# Patient Record
Sex: FEMALE | Race: WHITE | Employment: FULL TIME | URBAN - METROPOLITAN AREA
[De-identification: names, ages, dates, MRNs, and addresses within clinical notes are randomized per-mention and may not be internally consistent; named-entity substitution may affect disease eponyms.]

---

## 2017-07-24 NOTE — PROGRESS NOTES
TUBERCULOSIS SCREENING QUESTIONNAIRE  · Live vaccines in the past month? no  · Any steroid medication in the past month? no  · History of BCG vaccine? no  · If female, are you currently pregnant?  no  · Are you currently experiencing any of the following sy

## 2017-07-24 NOTE — PATIENT INSTRUCTIONS
You will need to return to clinic in 48-72 hours to have results of TB test read. Please return to clinic on Wednesday 7/26/17 between 5pm and 7:30pm or on Thursday 7/27/17 between 8am and 5pm  to have your TB test read.     If you do not return this cassie

## 2017-08-01 NOTE — PROGRESS NOTES
[facility] Internal Medicine Office Note  Chief Complaint:   Patient presents with:  Establish Care: former PCP was Dr. Diana Adame, insurance changed. Physical: physical for work, brought in paperwork.       HPI:   This is a 23year old fe ) Disp: 90 capsule Rfl: 3         Depression Screening (PHQ-2/PHQ-9): Over the LAST 2 WEEKS   Little interest or pleasure in doing things (over the last two weeks)?: Not at all    Feeling down, depressed, or hopeless (over the last two weeks)?: Not at all ASSESSMENT AND PLAN:   Marshall Shirley is a 23year old female with  Wellness examination  (primary encounter diagnosis)  Laboratory exam ordered as part of routine general medical examination  Hypothyroidism, unspecified type      The plan is as foll

## 2017-08-04 NOTE — TELEPHONE ENCOUNTER
Patient informed of MD recommendations, patient verbalized understanding with intent to comply. Offered opportunity to ask questions, all questions were answered. No future appointments.     Time started: 11:53 pm   Time ended: 11:55 pm  Total time spent o

## 2017-08-04 NOTE — TELEPHONE ENCOUNTER
Time started: 11:11am    Time ended: 11:18am    Total time spent on chart: 7 min    Patient states that she does feel tired and sluggish. She is currently on synthroid 100mcg. She is willing to change her dose and go on a generic. Pharm is on file.   Johann

## 2017-08-04 NOTE — TELEPHONE ENCOUNTER
Attempted to reach pt regarding results below, left message to call back. Dr Wing Coker needs to know if pt is feel fatigued or other related symptoms to thyroid, if so she will increase her dose.           Notes Recorded by Rick Thomas MD on 8/3/2017 at 12:

## 2017-08-04 NOTE — TELEPHONE ENCOUNTER
Increase to levothyroxine 112 mcg daily (ordered).  Review dose administration - take on an empty stomach 1 hour before eating or other medications  Check TSH and free T4 (ordered) in 6 weeks

## 2017-08-09 NOTE — TELEPHONE ENCOUNTER
Advised pt that fasting is recommended for her next labs in 6 mos to have her lipid panel checked for more accurate results.      Time started: 3:37 pm  Time ended: 3:38 pm    Total time spent on chart: 1 min

## 2017-08-09 NOTE — TELEPHONE ENCOUNTER
From: Karol Randle  To: Zain Pastor MD  Sent: 8/9/2017 2:45 PM CDT  Subject: Test Results Question    Hi Dr. Josefina Tsai,    So I was worried when I saw that my cholesterol results were all more elevated than they should be.  I realized I did not fast for t

## 2017-12-06 NOTE — TELEPHONE ENCOUNTER
Medication passed protocol. Requesting Levothyroxine 112 mcg  LOV: 8/1/17 - wellness exam  RTC: none noted. Per t.e.8/4/17 - increase levothyroxine to 112mcg, check TSH and FT 4 in 6 weeks.    [employer] message sent to pt reminding her to complete pen

## 2018-01-04 NOTE — TELEPHONE ENCOUNTER
Pt has requested records to be sent to Dr Prince Lazaro;pap,colpo,IUD insertion/removal any years of treatment    sent [id number], [address], [place] [address]    Mailed on January 4,2018

## 2018-02-14 NOTE — TELEPHONE ENCOUNTER
Pt notified that TSH and free T4 was ordered 8/2017, she can have labs drawn through [facility] labs and then ENDO will order their own labs. Understanding verbalized.

## 2018-02-14 NOTE — TELEPHONE ENCOUNTER
Patient called to request an order for lab work. Patient states she will be seeing a new endocrinologist and needs an order.

## 2018-02-19 NOTE — PROGRESS NOTES
CHIEF COMPLAINT:   Patient presents with:  Cough: x 2 days      HPI:   Serg Wayne is a 23year old female who presents for upper respiratory symptoms for  2 days. Patient reports congestion, cough is keeping pt up at night.  Symptoms have been unchanged NOSE: Nostrils patent, clear nasal discharge, nasal mucosa pink and non-inflammed   THROAT: Oral mucosa pink, moist. Posterior pharynx is not erythematous. no exudates. Tonsils 2/4.     NECK: Supple, non-tender  LUNGS: clear to auscultation bilaterally, no · You touch your eyes, nose, or mouth when your hand has a cold virus on it. This can happen if you touch an object that has the cold virus on it. What are the symptoms of a cold virus? Almost all colds involve a stuffy nose.  Other common symptoms includ · Disinfect things you touch often, such as phones and keyboards.    · Stay home when you have a cold. What are the possible complications of a cold virus? Colds usually go away by themselves.  But it’s not unusual to get another type of infection while y

## 2018-02-22 NOTE — PROGRESS NOTES
[facility] Internal Medicine Office Note  Chief Complaint:   Patient presents with: Follow - Up: [facility] follow up cough  x 5 days      HPI:   This is a 23year old female coming in for cough  HPI   Also sinus pressure b/l and sore throat  Temp 99. 9 Estrad 91-Day 0.15-0.03 MG Oral Tab Take 1 tablet by mouth daily. Disp: 91 tablet Rfl: 0         REVIEW OF SYSTEMS:   Review of Systems   Constitutional: Negative for fever. HENT: Positive for ear pain, sinus pressure and sore throat.     Eyes: Negative f up for worsening symptoms or fever above 100.4    Other orders  -     guaiFENesin-codeine (CHERATUSSIN AC) 100-10 MG/5ML Oral Solution; Take 5 mL by mouth nightly as needed for cough.       Meds & Refills for this Visit:  Signed Prescriptions Disp Refills

## 2018-02-22 NOTE — PATIENT INSTRUCTIONS
You can try Afrin nasal spray for decongestant, but do not take longer than 72 hours as it can cause worsening congestion. (do not take sudafed and afrin at the same time). Prescription cough syrup as needed at bedtime.

## 2019-02-14 NOTE — TELEPHONE ENCOUNTER
Please call patient: lab results received from health screening and let her know that results look good.  Labs are normal      From health screening:    Tchol 171  HDL 43  Trig 121      Glu 78    Cr 0.65   Normal LFTs    WBC 5.8  HGB 12.5  Plt 346

## 2023-06-08 LAB
ANTIBODY SCREEN OB: NEGATIVE
HEPATITIS B SURFACE ANTIGEN OB: NEGATIVE
HIV RESULT OB: NEGATIVE
RAPID PLASMA REAGIN OB: NONREACTIVE

## 2023-06-26 LAB — RH FACTOR OB: POSITIVE

## 2023-10-30 LAB
HIV RESULT OB: NEGATIVE
TREPONEMAL ANTIBODIES: NONREACTIVE

## 2023-11-27 LAB — STREP GP B CULT OB: NEGATIVE

## 2023-12-18 ENCOUNTER — HOSPITAL ENCOUNTER (INPATIENT)
Facility: HOSPITAL | Age: 28
LOS: 3 days | Discharge: HOME | End: 2023-12-21
Attending: OBSTETRICS & GYNECOLOGY | Admitting: OBSTETRICS & GYNECOLOGY
Payer: COMMERCIAL

## 2023-12-18 ENCOUNTER — ANESTHESIA (OUTPATIENT)
Dept: OBGYN UNIT | Facility: HOSPITAL | Age: 28
End: 2023-12-18
Payer: COMMERCIAL

## 2023-12-18 ENCOUNTER — ANESTHESIA EVENT (OUTPATIENT)
Dept: OBGYN UNIT | Facility: HOSPITAL | Age: 28
End: 2023-12-18
Payer: COMMERCIAL

## 2023-12-18 PROBLEM — Z34.90 PREGNANCY (HCC): Status: ACTIVE | Noted: 2023-12-18

## 2023-12-18 PROBLEM — Z34.90 PREGNANCY: Status: ACTIVE | Noted: 2023-12-18

## 2023-12-18 LAB
ANTIBODY SCREEN: NEGATIVE
BASOPHILS # BLD AUTO: 0.03 X10(3) UL (ref 0–0.2)
BASOPHILS NFR BLD AUTO: 0.4 %
EOSINOPHIL # BLD AUTO: 0.05 X10(3) UL (ref 0–0.7)
EOSINOPHIL NFR BLD AUTO: 0.7 %
ERYTHROCYTE [DISTWIDTH] IN BLOOD BY AUTOMATED COUNT: 12.3 %
HCT VFR BLD AUTO: 37.9 %
HGB BLD-MCNC: 12.9 G/DL
IMM GRANULOCYTES # BLD AUTO: 0.04 X10(3) UL (ref 0–1)
IMM GRANULOCYTES NFR BLD: 0.6 %
LYMPHOCYTES # BLD AUTO: 2.02 X10(3) UL (ref 1–4)
LYMPHOCYTES NFR BLD AUTO: 27.9 %
MCH RBC QN AUTO: 32.5 PG (ref 26–34)
MCHC RBC AUTO-ENTMCNC: 34 G/DL (ref 31–37)
MCV RBC AUTO: 95.5 FL
MONOCYTES # BLD AUTO: 0.39 X10(3) UL (ref 0.1–1)
MONOCYTES NFR BLD AUTO: 5.4 %
NEUTROPHILS # BLD AUTO: 4.7 X10 (3) UL (ref 1.5–7.7)
NEUTROPHILS # BLD AUTO: 4.7 X10(3) UL (ref 1.5–7.7)
NEUTROPHILS NFR BLD AUTO: 65 %
PLATELET # BLD AUTO: 262 10(3)UL (ref 150–450)
RBC # BLD AUTO: 3.97 X10(6)UL
RH BLOOD TYPE: POSITIVE
T PALLIDUM AB SER QL IA: NONREACTIVE
WBC # BLD AUTO: 7.2 X10(3) UL (ref 4–11)

## 2023-12-18 PROCEDURE — 86780 TREPONEMA PALLIDUM: CPT | Performed by: OBSTETRICS & GYNECOLOGY

## 2023-12-18 PROCEDURE — 86850 RBC ANTIBODY SCREEN: CPT | Performed by: OBSTETRICS & GYNECOLOGY

## 2023-12-18 PROCEDURE — 85025 COMPLETE CBC W/AUTO DIFF WBC: CPT | Performed by: OBSTETRICS & GYNECOLOGY

## 2023-12-18 PROCEDURE — 86901 BLOOD TYPING SEROLOGIC RH(D): CPT | Performed by: OBSTETRICS & GYNECOLOGY

## 2023-12-18 PROCEDURE — 86900 BLOOD TYPING SEROLOGIC ABO: CPT | Performed by: OBSTETRICS & GYNECOLOGY

## 2023-12-18 RX ORDER — CHOLECALCIFEROL (VITAMIN D3) 25 MCG
1 TABLET,CHEWABLE ORAL DAILY
Status: DISCONTINUED | OUTPATIENT
Start: 2023-12-18 | End: 2023-12-21

## 2023-12-18 RX ORDER — CHOLECALCIFEROL (VITAMIN D3) 25 MCG
1 TABLET,CHEWABLE ORAL DAILY
COMMUNITY
End: 2023-12-21

## 2023-12-18 RX ORDER — METHYLERGONOVINE MALEATE 0.2 MG/ML
0.2 INJECTION INTRAVENOUS ONCE
Status: COMPLETED | OUTPATIENT
Start: 2023-12-18 | End: 2023-12-18

## 2023-12-18 RX ORDER — KETOROLAC TROMETHAMINE 30 MG/ML
30 INJECTION, SOLUTION INTRAMUSCULAR; INTRAVENOUS ONCE
Status: COMPLETED | OUTPATIENT
Start: 2023-12-18 | End: 2023-12-18

## 2023-12-18 RX ORDER — DOXYCYCLINE HYCLATE 50 MG/1
325 CAPSULE, GELATIN COATED ORAL
COMMUNITY
End: 2023-12-21

## 2023-12-18 RX ORDER — KETOROLAC TROMETHAMINE 30 MG/ML
INJECTION, SOLUTION INTRAMUSCULAR; INTRAVENOUS
Status: COMPLETED
Start: 2023-12-18 | End: 2023-12-18

## 2023-12-18 RX ORDER — ACETAMINOPHEN 500 MG
1000 TABLET ORAL EVERY 6 HOURS
Status: DISCONTINUED | OUTPATIENT
Start: 2023-12-18 | End: 2023-12-21

## 2023-12-18 RX ORDER — KETAMINE HYDROCHLORIDE 50 MG/ML
INJECTION, SOLUTION INTRAMUSCULAR; INTRAVENOUS
Status: DISPENSED
Start: 2023-12-18 | End: 2023-12-18

## 2023-12-18 RX ORDER — DEXTROSE, SODIUM CHLORIDE, SODIUM LACTATE, POTASSIUM CHLORIDE, AND CALCIUM CHLORIDE 5; .6; .31; .03; .02 G/100ML; G/100ML; G/100ML; G/100ML; G/100ML
INJECTION, SOLUTION INTRAVENOUS CONTINUOUS PRN
Status: DISCONTINUED | OUTPATIENT
Start: 2023-12-18 | End: 2023-12-21

## 2023-12-18 RX ORDER — BUPIVACAINE HYDROCHLORIDE 7.5 MG/ML
INJECTION, SOLUTION INTRASPINAL AS NEEDED
Status: DISCONTINUED | OUTPATIENT
Start: 2023-12-18 | End: 2023-12-18 | Stop reason: SURG

## 2023-12-18 RX ORDER — HYDROMORPHONE HYDROCHLORIDE 1 MG/ML
0.2 INJECTION, SOLUTION INTRAMUSCULAR; INTRAVENOUS; SUBCUTANEOUS EVERY 5 MIN PRN
Status: DISCONTINUED | OUTPATIENT
Start: 2023-12-18 | End: 2023-12-18 | Stop reason: HOSPADM

## 2023-12-18 RX ORDER — NALBUPHINE HYDROCHLORIDE 10 MG/ML
2.5 INJECTION, SOLUTION INTRAMUSCULAR; INTRAVENOUS; SUBCUTANEOUS
Status: DISCONTINUED | OUTPATIENT
Start: 2023-12-18 | End: 2023-12-18 | Stop reason: HOSPADM

## 2023-12-18 RX ORDER — MORPHINE SULFATE 2 MG/ML
INJECTION, SOLUTION INTRAMUSCULAR; INTRAVENOUS AS NEEDED
Status: DISCONTINUED | OUTPATIENT
Start: 2023-12-18 | End: 2023-12-18 | Stop reason: SURG

## 2023-12-18 RX ORDER — MISOPROSTOL 200 UG/1
1000 TABLET ORAL ONCE
Status: COMPLETED | OUTPATIENT
Start: 2023-12-18 | End: 2023-12-18

## 2023-12-18 RX ORDER — KETOROLAC TROMETHAMINE 30 MG/ML
30 INJECTION, SOLUTION INTRAMUSCULAR; INTRAVENOUS EVERY 6 HOURS
Qty: 4 ML | Refills: 0 | Status: DISPENSED | OUTPATIENT
Start: 2023-12-18 | End: 2023-12-19

## 2023-12-18 RX ORDER — SODIUM CHLORIDE, SODIUM LACTATE, POTASSIUM CHLORIDE, CALCIUM CHLORIDE 600; 310; 30; 20 MG/100ML; MG/100ML; MG/100ML; MG/100ML
INJECTION, SOLUTION INTRAVENOUS CONTINUOUS
Status: DISCONTINUED | OUTPATIENT
Start: 2023-12-18 | End: 2023-12-18

## 2023-12-18 RX ORDER — GABAPENTIN 300 MG/1
300 CAPSULE ORAL EVERY 8 HOURS PRN
Status: DISCONTINUED | OUTPATIENT
Start: 2023-12-18 | End: 2023-12-19

## 2023-12-18 RX ORDER — ACETAMINOPHEN 500 MG
1000 TABLET ORAL ONCE
Status: COMPLETED | OUTPATIENT
Start: 2023-12-18 | End: 2023-12-18

## 2023-12-18 RX ORDER — SODIUM CHLORIDE, SODIUM LACTATE, POTASSIUM CHLORIDE, CALCIUM CHLORIDE 600; 310; 30; 20 MG/100ML; MG/100ML; MG/100ML; MG/100ML
125 INJECTION, SOLUTION INTRAVENOUS CONTINUOUS
Status: DISCONTINUED | OUTPATIENT
Start: 2023-12-18 | End: 2023-12-18 | Stop reason: HOSPADM

## 2023-12-18 RX ORDER — DOCUSATE SODIUM 100 MG/1
100 CAPSULE, LIQUID FILLED ORAL
Status: DISCONTINUED | OUTPATIENT
Start: 2023-12-18 | End: 2023-12-21

## 2023-12-18 RX ORDER — CITRIC ACID/SODIUM CITRATE 334-500MG
30 SOLUTION, ORAL ORAL ONCE
Status: DISCONTINUED | OUTPATIENT
Start: 2023-12-18 | End: 2023-12-18 | Stop reason: HOSPADM

## 2023-12-18 RX ORDER — CEFAZOLIN SODIUM/WATER 2 G/20 ML
2 SYRINGE (ML) INTRAVENOUS ONCE
Status: COMPLETED | OUTPATIENT
Start: 2023-12-18 | End: 2023-12-18

## 2023-12-18 RX ORDER — ONDANSETRON 2 MG/ML
INJECTION INTRAMUSCULAR; INTRAVENOUS AS NEEDED
Status: DISCONTINUED | OUTPATIENT
Start: 2023-12-18 | End: 2023-12-18 | Stop reason: SURG

## 2023-12-18 RX ORDER — METHYLERGONOVINE MALEATE 0.2 MG/ML
INJECTION INTRAVENOUS
Status: COMPLETED
Start: 2023-12-18 | End: 2023-12-18

## 2023-12-18 RX ORDER — MIDAZOLAM HYDROCHLORIDE 1 MG/ML
INJECTION INTRAMUSCULAR; INTRAVENOUS AS NEEDED
Status: DISCONTINUED | OUTPATIENT
Start: 2023-12-18 | End: 2023-12-18 | Stop reason: SURG

## 2023-12-18 RX ORDER — ONDANSETRON 2 MG/ML
4 INJECTION INTRAMUSCULAR; INTRAVENOUS EVERY 6 HOURS PRN
Status: DISCONTINUED | OUTPATIENT
Start: 2023-12-18 | End: 2023-12-18 | Stop reason: HOSPADM

## 2023-12-18 RX ORDER — BISACODYL 10 MG
10 SUPPOSITORY, RECTAL RECTAL ONCE AS NEEDED
Status: DISCONTINUED | OUTPATIENT
Start: 2023-12-18 | End: 2023-12-21

## 2023-12-18 RX ORDER — MISOPROSTOL 200 UG/1
TABLET ORAL
Status: COMPLETED
Start: 2023-12-18 | End: 2023-12-18

## 2023-12-18 RX ORDER — SIMETHICONE 80 MG
80 TABLET,CHEWABLE ORAL 3 TIMES DAILY PRN
Status: DISCONTINUED | OUTPATIENT
Start: 2023-12-18 | End: 2023-12-21

## 2023-12-18 RX ORDER — ONDANSETRON 2 MG/ML
4 INJECTION INTRAMUSCULAR; INTRAVENOUS ONCE AS NEEDED
Status: DISCONTINUED | OUTPATIENT
Start: 2023-12-18 | End: 2023-12-18 | Stop reason: HOSPADM

## 2023-12-18 RX ORDER — IBUPROFEN 600 MG/1
600 TABLET ORAL EVERY 6 HOURS
Status: DISCONTINUED | OUTPATIENT
Start: 2023-12-19 | End: 2023-12-21

## 2023-12-18 RX ORDER — LEVOTHYROXINE SODIUM 112 UG/1
112 TABLET ORAL
Status: DISCONTINUED | OUTPATIENT
Start: 2023-12-18 | End: 2023-12-21

## 2023-12-18 RX ADMIN — CEFAZOLIN SODIUM/WATER 2 G: 2 G/20 ML SYRINGE (ML) INTRAVENOUS at 09:18:00

## 2023-12-18 RX ADMIN — BUPIVACAINE HYDROCHLORIDE 1.5 ML: 7.5 INJECTION, SOLUTION INTRASPINAL at 09:15:00

## 2023-12-18 RX ADMIN — SODIUM CHLORIDE, SODIUM LACTATE, POTASSIUM CHLORIDE, CALCIUM CHLORIDE: 600; 310; 30; 20 INJECTION, SOLUTION INTRAVENOUS at 10:05:00

## 2023-12-18 RX ADMIN — MORPHINE SULFATE 0.2 MG: 2 INJECTION, SOLUTION INTRAMUSCULAR; INTRAVENOUS at 09:15:00

## 2023-12-18 RX ADMIN — ONDANSETRON 4 MG: 2 INJECTION INTRAMUSCULAR; INTRAVENOUS at 09:10:00

## 2023-12-18 RX ADMIN — SODIUM CHLORIDE, SODIUM LACTATE, POTASSIUM CHLORIDE, CALCIUM CHLORIDE: 600; 310; 30; 20 INJECTION, SOLUTION INTRAVENOUS at 09:10:00

## 2023-12-18 RX ADMIN — MIDAZOLAM HYDROCHLORIDE 2 MG: 1 INJECTION INTRAMUSCULAR; INTRAVENOUS at 09:35:00

## 2023-12-18 NOTE — PROGRESS NOTES
Pt is a 25year old female admitted to [facility]. Chief Complaint   Patient presents with    Scheduled       Pt is  39w2d intra-uterine pregnancy. History obtained, consents signed. Oriented to room, staff, and plan of care.

## 2023-12-18 NOTE — PROGRESS NOTES
Pt transferred to [facility] in room 2216. Vital signs stable on transfer. All belongings sent with patient. Baby ID bands matched and verified with parents.  Report given to [facility].

## 2023-12-18 NOTE — PLAN OF CARE
Problem: BIRTH - VAGINAL/ SECTION  Goal: Fetal and maternal status remain reassuring during the birth process  Description: INTERVENTIONS:  - Monitor vital signs  - Monitor fetal heart rate  - Monitor uterine activity  - Monitor labor progression (vaginal delivery)  - DVT prophylaxis (C/S delivery)  - Surgical antibiotic prophylaxis (C/S delivery)  Outcome: Progressing     Problem: PAIN - ADULT  Goal: Verbalizes/displays adequate comfort level or patient's stated pain goal  Description: INTERVENTIONS:  - Encourage pt to monitor pain and request assistance  - Assess pain using appropriate pain scale  - Administer analgesics based on type and severity of pain and evaluate response  - Implement non-pharmacological measures as appropriate and evaluate response  - Consider cultural and social influences on pain and pain management  - Manage/alleviate anxiety  - Utilize distraction and/or relaxation techniques  - Monitor for opioid side effects  - Notify MD/LIP if interventions unsuccessful or patient reports new pain  - Anticipate increased pain with activity and pre-medicate as appropriate  Outcome: Progressing     Problem: ANXIETY  Goal: Will report anxiety at manageable levels  Description: INTERVENTIONS:  - Administer medication as ordered  - Teach and rehearse alternative coping skills  - Provide emotional support with 1:1 interaction with staff  Outcome: Progressing     Problem: Patient/Family Goals  Goal: Patient/Family Long Term Goal  Description: Patient's Long Term Goal: Uncomplicated  section    Interventions:    VS per protocol  EFM per protocol  I&O  Aviles catheter  Abd prep with clippers as needed  SCD to bilateral lower extremities  NPO  Insert/maintain IV access  Antibiotics per protocol  Informed consent      Interventions:  -   - See additional Care Plan goals for specific interventions  Outcome: Progressing  Goal: Patient/Family Short Term Goal  Description: Patient's Short Term Goal: Adequate pain control with delivery of infant  Interventions:  Pain assessment scores as ordered  Patient scores pain a \"3\" or less  Multidisciplinary care   Nonpharmacologic comfort measures        Interventions:   -   - See additional Care Plan goals for specific interventions  Outcome: Progressing

## 2023-12-18 NOTE — PLAN OF CARE
Problem: GASTROINTESTINAL - ADULT  Goal: Minimal or absence of nausea and vomiting  Description: INTERVENTIONS:  - Maintain adequate hydration with IV or PO as ordered and tolerated  - Nasogastric tube to low intermittent suction as ordered  - Evaluate effectiveness of ordered antiemetic medications  - Provide nonpharmacologic comfort measures as appropriate  - Advance diet as tolerated, if ordered  - Obtain nutritional consult as needed  - Evaluate fluid balance  Outcome: Progressing  Goal: Maintains or returns to baseline bowel function  Description: INTERVENTIONS:  - Assess bowel function  - Maintain adequate hydration with IV or PO as ordered and tolerated  - Evaluate effectiveness of GI medications  - Encourage mobilization and activity  - Obtain nutritional consult as needed  - Establish a toileting routine/schedule  - Consider collaborating with pharmacy to review patient's medication profile  Outcome: Progressing  Goal: Maintains adequate nutritional intake (undernourished)  Description: INTERVENTIONS:  - Monitor percentage of each meal consumed  - Identify factors contributing to decreased intake, treat as appropriate  - Assist with meals as needed  - Monitor I&O, WT and lab values  - Obtain nutritional consult as needed  - Optimize oral hygiene and moisture  - Encourage food from home; allow for food preferences  - Enhance eating environment  Outcome: Progressing  Goal: Achieves appropriate nutritional intake (bariatric)  Description: INTERVENTIONS:  - Monitor for over-consumption  - Identify factors contributing to increased intake, treat as appropriate  - Monitor I&O, WT and lab values  - Obtain nutritional consult as needed  - Evaluate psychosocial factors contributing to over-consumption  Outcome: Progressing     Problem: POSTPARTUM  Goal: Long Term Goal:Experiences normal postpartum course  Description: INTERVENTIONS:  - Assess and monitor vital signs and lab values. - Assess fundus and lochia.   - Provide ice/sitz baths for perineum discomfort. - Monitor healing of incision/episiotomy/laceration, and assess for signs and symptoms of infection and hematoma. - Assess bladder function and monitor for bladder distention.  - Provide/instruct/assist with pericare as needed. - Provide VTE prophylaxis as needed. - Monitor bowel function.  - Encourage ambulation and provide assistance as needed. - Assess and monitor emotional status and provide social service/psych resources as needed. - Utilize standard precautions and use personal protective equipment as indicated. Ensure aseptic care of all intravenous lines and invasive tubes/drains.  - Obtain immunization and exposure to communicable diseases history. Outcome: Progressing  Goal: Optimize infant feeding at the breast  Description: INTERVENTIONS:  - Initiate breast feeding within first hour after birth. - Monitor effectiveness of current breast feeding efforts. - Assess support systems available to mother/family.  - Identify cultural beliefs/practices regarding lactation, letdown techniques, maternal food preferences. - Assess mother's knowledge and previous experience with breast feeding.  - Provide information as needed about early infant feeding cues (e.g., rooting, lip smacking, sucking fingers/hand) versus late cue of crying.  - Discuss/demonstrate breast feeding aids (e.g., infant sling, nursing footstool/pillows, and breast pumps). - Encourage mother/other family members to express feelings/concerns, and actively listen. - Educate father/SO about benefits of breast feeding and how to manage common lactation challenges. - Recommend avoidance of specific medications or substances incompatible with breast feeding.  - Assess and monitor for signs of nipple pain/trauma. - Instruct and provide assistance with proper latch. - Review techniques for milk expression (breast pumping) and storage of breast milk.  Provide pumping equipment/supplies, instructions and assistance, as needed. - Encourage rooming-in and breast feeding on demand.  - Encourage skin-to-skin contact. - Provide LC support as needed. - Assess for and manage engorgement. - Provide breast feeding education handouts and information on community breast feeding support. Outcome: Progressing  Goal: Establishment of adequate milk supply with medication/procedure interruptions  Description: INTERVENTIONS:  - Review techniques for milk expression (breast pumping). - Provide pumping equipment/supplies, instructions, and assistance until it is safe to breastfeed infant. Outcome: Progressing  Goal: Experiences normal breast weaning course  Description: INTERVENTIONS:  - Assess for and manage engorgement. - Instruct on breast care. - Provide comfort measures. Outcome: Completed  Goal: Appropriate maternal -  bonding  Description: INTERVENTIONS:  - Assess caregiver- interactions. - Assess caregiver's emotional status and coping mechanisms. - Encourage caregiver to participate in  daily care. - Assess support systems available to mother/family.  - Provide /case management support as needed.   Outcome: Progressing

## 2023-12-18 NOTE — BRIEF OP NOTE
Pre-Operative Diagnosis: 44 WEEK IUP BREECH     Post-Operative Diagnosis: SAME, DELIVERED      Procedure Performed:    SECTION    Surgeon(s) and Role:     * Alicia Day MD - Primary     * Shavon Munoz MD - Assisting Surgeon       Surgical Findings: meek breech female, 7# 14oz (8, 8,9). Heart shaped uterus, normal tubes and ovaries.      Specimen: none     Estimated Blood Loss: 900cc    Ady Gamez MD  2023  9:55 AM

## 2023-12-18 NOTE — ANESTHESIA PROCEDURE NOTES
Spinal Block    Performed by: Rufus Landry MD  Authorized by: Rufus Landry MD      General Information and Staff    Start Time:   Anesthesiologist:  Rufus Landry MD  Performed by: Anesthesiologist  Site identification: surface landmarks    Reason for Block: at surgeon's request and surgical anesthesia    Preanesthetic Checklist: patient identified, IV checked, risks and benefits discussed, monitors and equipment checked, pre-op evaluation, timeout performed, anesthesia consent and sterile technique used      Procedure Details    Patient Position:  Sitting  Prep: ChloraPrep    Monitoring:  Cardiac monitor, heart rate and continuous pulse ox  Approach:  Midline  Location:  L4-5  Injection Technique:  Single-shot    Needle    Needle Type:  Sprotte  Needle Gauge:  24 G  Needle Length:  3.5 in    Assessment    Sensory Level:  T6  Events: clear CSF, CSF aspirated, well tolerated and blood negative      Additional Comments     First attempt successful. No resistance, pain or parasthesias on injection. Aspiration of clear CSF both at start of injection and at end.

## 2023-12-19 LAB
BASOPHILS # BLD AUTO: 0.01 X10(3) UL (ref 0–0.2)
BASOPHILS NFR BLD AUTO: 0.1 %
EOSINOPHIL # BLD AUTO: 0.06 X10(3) UL (ref 0–0.7)
EOSINOPHIL NFR BLD AUTO: 0.7 %
ERYTHROCYTE [DISTWIDTH] IN BLOOD BY AUTOMATED COUNT: 12.3 %
HCT VFR BLD AUTO: 25 %
HGB BLD-MCNC: 8.4 G/DL
IMM GRANULOCYTES # BLD AUTO: 0.06 X10(3) UL (ref 0–1)
IMM GRANULOCYTES NFR BLD: 0.7 %
LYMPHOCYTES # BLD AUTO: 1.66 X10(3) UL (ref 1–4)
LYMPHOCYTES NFR BLD AUTO: 19.6 %
MCH RBC QN AUTO: 33.1 PG (ref 26–34)
MCHC RBC AUTO-ENTMCNC: 33.6 G/DL (ref 31–37)
MCV RBC AUTO: 98.4 FL
MONOCYTES # BLD AUTO: 0.41 X10(3) UL (ref 0.1–1)
MONOCYTES NFR BLD AUTO: 4.9 %
NEUTROPHILS # BLD AUTO: 6.25 X10 (3) UL (ref 1.5–7.7)
NEUTROPHILS # BLD AUTO: 6.25 X10(3) UL (ref 1.5–7.7)
NEUTROPHILS NFR BLD AUTO: 74 %
PLATELET # BLD AUTO: 192 10(3)UL (ref 150–450)
RBC # BLD AUTO: 2.54 X10(6)UL
WBC # BLD AUTO: 8.5 X10(3) UL (ref 4–11)

## 2023-12-19 PROCEDURE — 85025 COMPLETE CBC W/AUTO DIFF WBC: CPT | Performed by: OBSTETRICS & GYNECOLOGY

## 2023-12-19 RX ORDER — GABAPENTIN 300 MG/1
300 CAPSULE ORAL EVERY 8 HOURS
Status: DISCONTINUED | OUTPATIENT
Start: 2023-12-19 | End: 2023-12-21

## 2023-12-21 VITALS
TEMPERATURE: 98 F | WEIGHT: 174 LBS | BODY MASS INDEX: 30.83 KG/M2 | HEIGHT: 63 IN | OXYGEN SATURATION: 100 % | RESPIRATION RATE: 14 BRPM | HEART RATE: 81 BPM | DIASTOLIC BLOOD PRESSURE: 79 MMHG | SYSTOLIC BLOOD PRESSURE: 116 MMHG

## 2023-12-21 PROBLEM — Z34.90 PREGNANCY (HCC): Status: RESOLVED | Noted: 2023-12-18 | Resolved: 2023-12-21

## 2023-12-21 PROBLEM — Z34.90 PREGNANCY: Status: RESOLVED | Noted: 2023-12-18 | Resolved: 2023-12-21

## 2023-12-21 RX ORDER — PSEUDOEPHEDRINE HCL 30 MG
100 TABLET ORAL 2 TIMES DAILY
Qty: 60 CAPSULE | Refills: 1 | Status: SHIPPED | OUTPATIENT
Start: 2023-12-21

## 2023-12-21 RX ORDER — IBUPROFEN 600 MG/1
600 TABLET ORAL EVERY 6 HOURS
Qty: 60 TABLET | Refills: 1 | Status: SHIPPED | OUTPATIENT
Start: 2023-12-21

## 2023-12-21 RX ORDER — GABAPENTIN 300 MG/1
300 CAPSULE ORAL EVERY 8 HOURS
Qty: 30 CAPSULE | Refills: 1 | Status: SHIPPED | OUTPATIENT
Start: 2023-12-21

## 2023-12-21 NOTE — DISCHARGE SUMMARY
[facility]  Discharge Summary    Ibrahima Yang Patient Status:  Inpatient    3/27/1995 MRN [id number]   [facility] 2SW-J Attending Lore Suarez MD   [facility] Day # 3 PCP Mirian Jensen MD     Primary OB Clinician: Melissa Bermudez    [facility]: Estimated Date of Delivery: 23    Gestational Age: 44w2d    Antepartum complications: none    Date of Delivery: 23    Time of Delivery: AM    Delivered By: Sherlyn Grayson MD    Delivery Type: primary  section, low transverse incision    Baby: Liveborn female, Apgars 9/9, weight 7 #, 14 oz. Anesthesia: spinal    Intrapartum Complications: None    Laceration: n/a    Episiotomy: Pfannensteil    Placenta: manual removal    Feeding Method: both breast and bottle fed     Rh Immune Globulin Given: no    Rubella Vaccine Given: no    Discharge Medications: Gabapentin, IBU, Colace    Discharge Date: 23    Discharge Time: AM    Plan:     Address and phone number verified and same. Follow-up appointment with Melissa Bermudez in 2 weeks.     Sherlyn Grayson MD  2023  7:59 AM

## 2023-12-21 NOTE — PROGRESS NOTES
Went over discharge paperwork with patient. Patient understands she needs to make a follow up appt to be seen in 2 weeks for postpartum visit. Went over signs and symptoms of preeclampsia and postpartum expectations. Patient encouraged to call doctor with any concerns. Bands matched with infant. Kisses deactivated.

## 2023-12-23 ENCOUNTER — TELEPHONE (OUTPATIENT)
Dept: OBGYN UNIT | Facility: HOSPITAL | Age: 28
End: 2023-12-23

## 2024-01-31 ENCOUNTER — NURSE ONLY (OUTPATIENT)
Dept: LACTATION | Facility: HOSPITAL | Age: 29
End: 2024-01-31
Attending: OBSTETRICS & GYNECOLOGY
Payer: COMMERCIAL

## 2024-01-31 ENCOUNTER — LACTATION ENCOUNTER (OUTPATIENT)
Dept: LACTATION | Facility: HOSPITAL | Age: 29
End: 2024-01-31

## 2024-01-31 PROCEDURE — 99212 OFFICE O/P EST SF 10 MIN: CPT

## 2024-01-31 RX ORDER — MULTIVIT,IRON,MINERALS/LUTEIN
TABLET ORAL
COMMUNITY

## 2024-02-01 NOTE — LACTATION NOTE
This note was copied from a baby's chart.  LACTATION NOTE - INFANT    Evaluation Type  Evaluation Type: Outpatient Initial    Problems & Assessment  Problems Diagnosed or Identified: Infant feeding problem;Latch difficulty  Problems: comment/detail: Kori was born at 39.2 wks, c/s delivery for breech presentation. She was Jewell+ and required phototherapy in the hospital. Kroi weighed 7lbs 14.3 oz at birth and weighed 9 lbs 15.1 oz today at 6 wks old and is unable to sustain a latch with BF attempts. Mother has been exclusively pumping and bottle feeding since discharge home. Infant is currently being seen at [facility] for hip dysplasia and is wearing a Lucretia Harness. Mother came to the [facility] for BF/milk supply support. She wanted help trying to BF and had questions about pumping. Mother has a history of anxiety and she scored 7/30 on the EPDS which was the same as during her hospital stay. Mother has a counsellor for emotional support, she interacted well with infant and asked appropriate question.  introduced a Nipple Shield with BF attempt today, however Kori was not able to sustain a latch and was bottle fed Expressed breast milk. Kori shows signs she may have some oral restrictions contributing to latch difficulty. Infant makes a smacking noise intermittently as she releases the seal on the bottle nipple, EBM leaks out of the corners of her mouth when she is sucking and she has tightness/ creases in the corners of her mouth as she is sucking. Discussed information with mother who is a school based speech therapist and a handout on tongue tie was given and discussed with mother. Enc to discuss with pediatrician.  Infant Assessment: Anterior fontanel soft and flat;Abdomen soft, non-distended;Hunger cues present;Oral mucous membranes moist;Skin color: pink or appropriate for ethnicity (High palate, slightly recessed chin)  Muscle tone: Appropriate for GA    Feeding  Assessment  Summary Current Feeding: Pumping and feeding by bottle  Last 24 hour feeding summary: Bottles 8 (150 ml EBM)  Breastfeeding Assessment: Assisted with breastfeeding w/mother's permission;Fussy infant, unable to sustain suckling to breast;No sustained latch to breast;Nipple shield initiated with non-nutritive suckling;PO supplement followed breastfeeding  Breastfeeding lasted # of minutes: 10  Breastfeeding Positions: right breast;left breast;cross cradle  Latch: Repeated attempts, hold nipple in mouth, stimulate to suck  Audible Sucks/Swallows: None  Type of Nipple: Flat  Comfort (Breast/Nipple): Soft/non-tender  Hold (Positioning): Full assist, teach one side, mother does other, staff holds  LATCH Score: 5  Other (comment): Assisted with BF attempt. Infant was fussy with BF attempt and LC introduced the NS. Infant latched on the 1st breast using the NS, took a few sucks, but was unable to sustain a latch and no measurable amount of milk was transfered with the Breastfeeding attempt.  Mother had questions about her pumping and feeding plan Discussed mother's pumping schedule and trying to extend the pumping time during the night so she is able to get more rest. Mother has been waking infant by 3 hrs for feedings including during the night. Mother stated she was advised by ped infant could go longer during the night between feedings. LC stated that pt should try to go up to 4 hrs during the night if infant is sleeping to allow for more rest. At this time mother feels comfortable with pumping and bottle feeding, but she may try practicing with the NS. Discussed precautions if mother is BF with a NS for close follow up for weight checks and continuing pumping until BF would be established. Enc to call LC if she has further questions about pumping/Bf. Follow up with LC prn. Mother had questions about infant having a possible tongue tie. Discussed signs of possible oral restrictions and enc to follow up with a  speech/myofunctional therapist and discuss information with her pediatrician.    Output  # Voids in 24 hours: 8  # Stools in 24 hours: 5    Pre/Post Weights  Pre-Weight Right Breast (g): 5076  Post-Weight Right Breast (g): 5076  ml of milk, RT Brst: 0  Supplement Type: EBM  Supplement total, ml: 100    Equipment used  Equipment used: Bottle with slow flow nipple;Nipple Shield  Nipple shield size: 20 mm

## 2024-02-01 NOTE — PATIENT INSTRUCTIONS
Betzy if you choose to attempt to breastfeed Kori with a Nipple Shield, remember that it's important to have her weight check weekly until your pediatrician feels she is Breastfeeding well and she is gaining weight appropriately.  Watch her wet diapers and stools and if you notice her wet diapers are decreasing have a weight check sooner. Offer supplements of Expressed breast milk after Breastfeeding until she is doing well with Breastfeeding and use your breast pump after breastfeeding until her feedings are well established. Follow up with the [facility] for assistance as needed.    Otherwise continue your feeding plan with bottle feeding Kori on demand and waking her by 3 hours during the day and extending her feedings at night to 4 hours if she is able to sleep. As you extend your pumping frequency, check for plugged milk ducts, review the care for plugged milk ducts and call your OB Doctor with any concerns.  If you are concerned that Kori may have any oral restrictions, you may discuss this with your pediatrician and/or have Kori evaluated by a speech/myofunctional therapist  Discuss the feeding plan with your pediatrician if you have any questions.    Guidelines for Using a Nipple Shield    Refer to the ’s instructions. These are additional suggestions only.  This thin silicone nipple shield (size 20mm ) has been recommended to assist your baby to latch on to the breast or for protection of your nipples while your baby learns to breastfeed correctly.  Use of the shield is considered temporary, allowing you to begin breastfeeding your baby. Some infants have  successfully using the shield for longer periods, however, checking your infant’s weight regularly is recommended to assure adequate growth while using the nipple shield.    Cautions regarding nipple shield use:  The use of a nipple shield may decrease your milk supply and could decrease the amount of milk your  baby receives.  Careful follow-up, including weight checks, with your lactation consultant and baby’s health care provider is important.   Do not use a different nipple shield.     Before feeding your baby:  Apply warm, moist heat to your breasts for a few minutes to increase milk flow.  Rinse the shield in warm water to make it          softer and easier to adhere to the breast.  Apply nipple shield correctly:               Hold the shield by the rim, turn it inside-out senior living. Place the shield, centered over the                 nipple and flip the shield right side out, drawing your nipple and areola into the                shield as much as possible.  Massage breasts and if possible, hand express  some breastmilk into the nipple shield.     Latching your baby on to the breast:  Stroke your baby’s lower lip with the nipple of the shield. Wait for your baby to open wide like a “yawn,” with the tongue down and out over the lower lip. Bring baby’s mouth directly over the shield. It may take a few attempts before your baby settles into a rhythmical suckling pattern.  After several minutes of regular swallowing at the breast, you may want to try to reattach your baby to your breast without the shield.  When your baby’s swallowing slows on the first side, repeat this process on the other breast.   If needed, trickle drops of your expressed milk or formula onto the nipple to encourage your baby to latch on. If your baby becomes upset or has not latched on within 20 minutes, stop and feed your baby using another method.    Signs of correct, effective suckling include:  Your baby swallows with nearly every suck (this is called nutritive suckling: swallowing every 1-3 sucks)  Your baby sustains nutritive suck and swallow pattern for at least 15-30 minutes, offer both breasts at each feeding.  Your nipples are not painful during the feeding.  Your baby is content after most feedings.  Your baby has adequate diapers (at  least 6-8 wet and 3-4 loose, yellow stools every 24 hours by the 4th day of life) AND gains at least 4-8 ounces per week (one-half to one ounce per day). Use the breastfeeding journal to record your baby’s feedings and diapers.    Is a supplement needed?  If your baby does not latch on, or swallows less than 15-30 minutes at a feeding - swallowing after most sucks (at least every 1-3 sucks), feed your baby additional expressed breastmilk or formula by  wide base slow flow bottle with 1 to 2 +oz to satisfaction.                 After feeding your baby:  Pump your breasts for 10-15 minutes using a hospital grade rental breast pump, after most daytime feedings. This may help maintain adequate milk supply while using the shield. If your baby is not latching on yet, pump at least 8-12 times each 24 hours.  Wash the nipple shield in hot soapy water, rinse and air dry. The shield may be boiled.     Follow-up is VERY important!  Let your baby’s health care provider know immediately if it is difficult for you to feed your baby or if the number of wet or stool diapers is inadequate.   Follow-up visits with your lactation consultant and baby’s health care provider are necessary when using the shield.   Your baby’s weight should be checked 1-2 times each week while using a nipple shield.      Breastfeeding Suggestions for Plugged Duct/Breast Infection (mastitis)    Prior to handling baby, your breasts, or breast pump, remember to wash hands with soap and water.    Apply cool compress to the breast 10 minutes before pumping and gently massage the breast thorough before pumping:           Apply cold compresses for 10 minutes on your breast if needed to decrease breast swelling after nursing or pumping:     Rest and drink plenty of fluids.    Ask your doctor if you can take ibuprofen     Call your OB doctor with any signs of   mastitis (breast infection)  Plugged area(s) are not soft within 24 hours.   Breast becomes firm,  reddened, or painful.   Fever, chills, or flu-like symptoms. Check your temperature 3 times daily until a plugged duct or mastitis resolves.    If mastitis occurs:  Continue breastfeeding and/or pumping - your milk is not infected.   Continue above treatment for relieving plugged area(s)  Check with your doctor about taking ibuprofen for relief of discomfort.  Continue to take antibiotic as prescribed even though you may quickly feel better.   Contact your doctor is you are not feeling significantly better within 1-2 days of starting antibiotic, sooner if symptoms worsen.    Call lactation consultant [phone] as needed.    For additional information:   La [employer] website [email or web address]

## 2024-02-01 NOTE — LACTATION NOTE
LACTATION NOTE - MOTHER      Evaluation Type: Outpatient Initial    Problems identified  Problems identified: Knowledge deficit;Milk supply WNL;Unable to acheive sustained latch;Flat nipple(s)  Problems Identified Other: Infant was born at 39.2 wks c/s delivery for breech presentation. Infant was Jewell+ and required phototherapy in the hospital. Infant initially had difficulty latching, and needed to be supplemented with formula in the hospital. Pt has been exclusively pumping and bottle feeding since discharge home. Infant is now 6 wks old and is unable to sustain a latch with BF attempts. Infant is currently being seen at [facility] for hip dysplasia and is  wearing a Lucretia Harness. Pt came to the [facility] for BF/milk supply support. She wanted help trying to BF and had questions about pumping. Pt has a history of anxiety. She scored 7/30 on the EPDS which was the same as during her hospital stay. She has a counsellor for emotional support which she has only seen virturally, but pt reported she is trying to find someone else. LC reviewed support available through [facility] talk and [facility] support Group Reviewed the PMAD handout. Pt asked appropriate questions and interacted well with infant.    Maternal history  Maternal history: Anemia;Anxiety;Hypothyroid  Other/comment: Hx Goiter, Ovarian Cyst    Breastfeeding goal  Breastfeeding goal: To maintain breast milk feeding per patient goal    Maternal Assessment  Bilateral Breasts: Soft;Symmetrical  Bilateral Nipples: Flat;Pink;Elastic (Milk easily expressed)  Prior breastfeeding experience (comment below): Primip  Breastfeeding Assistance: Breast exam provided with permission;Hand expression provided with permission;Breastfeeding assistance provided with permission;Pumping assistance provided with permission    Pain assessment  Pain, additional: Pinching;Pain w/initial sucks only  Location/Comment: Pt c/o pinching  discomfort when infant was attempting to BF using the Nipple shield  Pain scale comment: Pt denied discomfort when using her breast pump    Guidelines for use of:  Equipment: Nipple shield  Breast pump type: Spectra  Suggested use of pump: Pump 8-12X/24hr;Pump each time a supplement is offered  Post-feed pumped volume: 100 ml in 5 mins, Pt reported Right breast is producing more milk than the Left breast  Other (comment): Assisted with BF attempt. Infant was fussy with BF attempt and LC introduced the NS. Infant latched on the 1st breast using the NS, took a few sucks, but was unable to sustain a latch and no measurable amount of milk was  transfered with the Breastfeeding attempt. Observed pt pumping, pt had questions about proper flange fit, 21mm flange fit properly and pt denied discomfort with pumping. Discussed ways to increase her milk supply on her Left breast, enc to pump an additional 5 mins on her Left side using the hands on pumping technique while pumping to help release more breast milk, enc HE before and after pumping. Discussed pt's pumping schedule and trying to extend the pumping time during the night so she is able to get more rest. Pt has been waking infant by 3 hrs for feedings including during the night. Pt stated she was advised by ped infant could go longer during the night between feedings. LC stated that pt should try to go up to 4 hrs during the night if infant is sleeping to allow for more rest. Discussed seeing how her body responds to extending the pumping interval during the night, Reviewed secondary engorgement and checking for plugged milk ducts as she extends the time between pumping. Pt had her questions answered. Feels comfortable with pumping and bottle feeding and enc to call LC if she has further questions. Follow up with [facility] prn.

## 2024-12-23 LAB
ANTIBODY SCREEN OB: NEGATIVE
HEPATITIS B SURFACE ANTIGEN OB: NEGATIVE
HIV RESULT OB: NEGATIVE
RAPID PLASMA REAGIN OB: NONREACTIVE
RH FACTOR OB: POSITIVE

## 2025-02-17 ENCOUNTER — HOSPITAL ENCOUNTER (OUTPATIENT)
Dept: ULTRASOUND IMAGING | Age: 30
Discharge: HOME | End: 2025-02-17
Attending: OBSTETRICS & GYNECOLOGY
Payer: COMMERCIAL

## 2025-02-17 DIAGNOSIS — Z34.82 PRENATAL CARE, SUBSEQUENT PREGNANCY, SECOND TRIMESTER (HCC): ICD-10-CM

## 2025-02-17 PROCEDURE — 76805 OB US >/= 14 WKS SNGL FETUS: CPT | Performed by: OBSTETRICS & GYNECOLOGY

## 2025-05-12 LAB — HIV RESULT OB: NEGATIVE

## 2025-05-13 NOTE — PROGRESS NOTES
Outpatient Maternal-Fetal Medicine Consultation    Dear Dr. Lazaro    Thank you for requesting ultrasound evaluation and maternal fetal medicine consultation on your patient Betzy Yang.  As you are aware she is a 30 year old female  with a jain pregnancy and an Estimated Date of Delivery: 25.  A maternal-fetal medicine consultation was requested secondary to size < dates.  Her prenatal records and labs were reviewed.    HISTORY  # 1 - Date: 23, Sex: None, Weight: None, GA: None, Type: None, Apgar1: None, Apgar5: None, Living: None, Birth Comments: None    # 2 - Date: 23, Sex: Female, Weight: 7 lb 14.3 oz (3.58 kg), GA: 39w2d, Type: Caesarean Section, Apgar1: 8, Apgar5: 8, Living: Living, Birth Comments: None    # 3 - Date: None, Sex: None, Weight: None, GA: None, Type: None, Apgar1: None, Apgar5: None, Living: None, Birth Comments: None      Past Medical History  The patient  has a past medical history of Hypothyroidism and Ovarian cyst.    Past Surgical History  The patient  has a past surgical history that includes wisdom teeth removed.    Family History  The patient She indicated that her mother is alive. She indicated that her father is alive. She indicated that the status of her maternal grandmother is unknown. She indicated that the status of her maternal grandfather is unknown. She indicated that her paternal grandmother is . She indicated that her paternal grandfather is alive.      Medications:   Current Outpatient Medications:     ferrous sulfate 325 (65 FE) MG Oral Tab EC, Take 1 tablet (325 mg total) by mouth daily with breakfast., Disp: , Rfl:     FOLIC ACID OR, Take by mouth., Disp: , Rfl:     Prenatal Vit-Fe Fumarate-FA (MULTI PRENATAL) 27-0.8 MG Oral Tab, Take by mouth., Disp: , Rfl:     levothyroxine 112 MCG Oral Tab, Take 1 tablet (112 mcg total) by mouth before breakfast. (Patient taking differently: Take 125 mcg by mouth. 125 mcg 5 days/week 137mcg 2  days/ week), Disp: 90 tablet, Rfl: 3    docusate sodium 100 MG Oral Cap, Take 100 mg by mouth 2 (two) times daily., Disp: 60 capsule, Rfl: 1    gabapentin 300 MG Oral Cap, Take 1 capsule (300 mg total) by mouth every 8 (eight) hours., Disp: 30 capsule, Rfl: 1    ibuprofen 600 MG Oral Tab, Take 1 tablet (600 mg total) by mouth every 6 (six) hours., Disp: 60 tablet, Rfl: 1  Allergies:   Allergies   Allergen Reactions    Wheat Gluten DIARRHEA and OTHER (SEE COMMENTS)     Abdominal cramping       PHYSICAL EXAMINATION:  /71 (BP Location: Right arm, Patient Position: Sitting, Cuff Size: adult)   Pulse 92   Ht 5' 2\" (1.575 m)   Wt 173 lb (78.5 kg)   LMP 09/27/2024   BMI 31.64 kg/m²   General: alert and oriented,no acute distress  Abdomen: gravid, soft, non-tender  Extremities: non-tender, no edema    OBSTETRIC ULTRASOUND    Ultrasound Findings:  Single IUP in breech presentation.    Placenta is anterior.   A 3 vessel cord is noted.  Cardiac activity is present at 139 bpm  EFW 2036 g ( 4 lb 8 oz); 43%.    WAYNE is  10.4 cm.  MVP is 4.2 cm  BPP is 8/8.     The fetal measurements are consistent with established EDC. No gross ultrasound evidence of structural abnormalities are seen today. The patient understands that ultrasound cannot rule out all structural and chromosomal abnormalities.     See PACS/Imaging Tab For Complete Ultrasound Report  I interpreted the results and reviewed them with the patient.    DISCUSSION  During her visit we discussed and reviewed the following issues:  SIZE < DATES  AGA growth today    IMPRESSION:  IUP at 32w6d  Hypothyroidism  Size < Dates - AGA growth    RECOMMENDATIONS:  Continue care with Dr. Lazaro  Routine antepartum care    Thank you for allowing me to participate in the care of your patient.  Please do not hesitate to contact me if additional questions or concerns arise.      Marky Tam M.D.    30 minutes spent in review of records, patient consultation, documentation and  coordination of care.  The relevant clinical matter(s) are summarized above.     Note to patient and family  The 21st Century Cures Act makes medical notes available to patients in the interest of transparency.  However, please be advised that this is a medical document.  It is intended as yubb-on-cmba communication.  It is written and medical language may contain abbreviations or verbiage that are technical and unfamiliar.  It may appear blunt or direct.  Medical documents are intended to carry relevant information, facts as evident, and the clinical opinion of the practitioner.

## 2025-05-15 ENCOUNTER — OFFICE VISIT (OUTPATIENT)
Dept: PERINATAL CARE | Facility: HOSPITAL | Age: 30
End: 2025-05-15
Attending: OBSTETRICS & GYNECOLOGY
Payer: COMMERCIAL

## 2025-05-15 VITALS
SYSTOLIC BLOOD PRESSURE: 119 MMHG | BODY MASS INDEX: 31.83 KG/M2 | HEART RATE: 92 BPM | WEIGHT: 173 LBS | HEIGHT: 62 IN | DIASTOLIC BLOOD PRESSURE: 71 MMHG

## 2025-05-15 DIAGNOSIS — O36.5930 POOR FETAL GROWTH AFFECTING MANAGEMENT OF MOTHER IN THIRD TRIMESTER, SINGLE OR UNSPECIFIED FETUS (HCC): ICD-10-CM

## 2025-05-15 DIAGNOSIS — O36.5990: ICD-10-CM

## 2025-05-15 DIAGNOSIS — O36.5990: Primary | ICD-10-CM

## 2025-05-15 PROCEDURE — 76816 OB US FOLLOW-UP PER FETUS: CPT | Performed by: OBSTETRICS & GYNECOLOGY

## 2025-05-15 PROCEDURE — 76819 FETAL BIOPHYS PROFIL W/O NST: CPT

## 2025-05-15 RX ORDER — FERROUS SULFATE 325(65) MG
325 TABLET, DELAYED RELEASE (ENTERIC COATED) ORAL
COMMUNITY

## 2025-06-06 LAB — STREP GP B CULT OB: POSITIVE

## 2025-06-13 ENCOUNTER — TELEPHONE (OUTPATIENT)
Dept: OBGYN UNIT | Facility: HOSPITAL | Age: 30
End: 2025-06-13

## 2025-06-13 NOTE — PROGRESS NOTES
Pt given arrival instructions. RN reviewed  procedure, general plan of care, and  pain medication. Questions answered. Pt verbalizes understanding.    (4) rarely moist

## 2025-06-19 ENCOUNTER — TELEPHONE (OUTPATIENT)
Dept: OBGYN UNIT | Facility: HOSPITAL | Age: 30
End: 2025-06-19

## 2025-06-24 ENCOUNTER — ANESTHESIA EVENT (OUTPATIENT)
Dept: OBGYN UNIT | Facility: HOSPITAL | Age: 30
End: 2025-06-24
Payer: COMMERCIAL

## 2025-06-27 ENCOUNTER — ANESTHESIA (OUTPATIENT)
Dept: OBGYN UNIT | Facility: HOSPITAL | Age: 30
End: 2025-06-27
Payer: COMMERCIAL

## 2025-06-27 ENCOUNTER — HOSPITAL ENCOUNTER (INPATIENT)
Facility: HOSPITAL | Age: 30
LOS: 2 days | Discharge: HOME | End: 2025-06-29
Attending: OBSTETRICS & GYNECOLOGY | Admitting: OBSTETRICS & GYNECOLOGY
Payer: COMMERCIAL

## 2025-06-27 PROBLEM — Z98.891 S/P CESAREAN SECTION: Status: ACTIVE | Noted: 2025-06-27

## 2025-06-27 PROBLEM — Z34.90 PREGNANCY (HCC): Status: ACTIVE | Noted: 2025-06-27

## 2025-06-27 LAB
ANTIBODY SCREEN: NEGATIVE
BASOPHILS # BLD AUTO: 0.02 X10(3) UL (ref 0–0.2)
BASOPHILS NFR BLD AUTO: 0.3 %
EOSINOPHIL # BLD AUTO: 0.04 X10(3) UL (ref 0–0.7)
EOSINOPHIL NFR BLD AUTO: 0.7 %
ERYTHROCYTE [DISTWIDTH] IN BLOOD BY AUTOMATED COUNT: 13 %
HCT VFR BLD AUTO: 33.7 % (ref 35–48)
HGB BLD-MCNC: 11.8 G/DL (ref 12–16)
IMM GRANULOCYTES # BLD AUTO: 0.02 X10(3) UL (ref 0–1)
IMM GRANULOCYTES NFR BLD: 0.3 %
LYMPHOCYTES # BLD AUTO: 1.91 X10(3) UL (ref 1–4)
LYMPHOCYTES NFR BLD AUTO: 32.6 %
MCH RBC QN AUTO: 32.6 PG (ref 26–34)
MCHC RBC AUTO-ENTMCNC: 35 G/DL (ref 31–37)
MCV RBC AUTO: 93.1 FL (ref 80–100)
MONOCYTES # BLD AUTO: 0.34 X10(3) UL (ref 0.1–1)
MONOCYTES NFR BLD AUTO: 5.8 %
NEUTROPHILS # BLD AUTO: 3.53 X10 (3) UL (ref 1.5–7.7)
NEUTROPHILS # BLD AUTO: 3.53 X10(3) UL (ref 1.5–7.7)
NEUTROPHILS NFR BLD AUTO: 60.3 %
PLATELET # BLD AUTO: 223 10(3)UL (ref 150–450)
RBC # BLD AUTO: 3.62 X10(6)UL (ref 3.8–5.3)
RH BLOOD TYPE: POSITIVE
T PALLIDUM AB SER QL IA: NONREACTIVE
WBC # BLD AUTO: 5.9 X10(3) UL (ref 4–11)

## 2025-06-27 PROCEDURE — 59514 CESAREAN DELIVERY ONLY: CPT | Performed by: OBSTETRICS & GYNECOLOGY

## 2025-06-27 RX ORDER — SODIUM CHLORIDE, SODIUM LACTATE, POTASSIUM CHLORIDE, CALCIUM CHLORIDE 600; 310; 30; 20 MG/100ML; MG/100ML; MG/100ML; MG/100ML
INJECTION, SOLUTION INTRAVENOUS CONTINUOUS
Status: DISCONTINUED | OUTPATIENT
Start: 2025-06-27 | End: 2025-06-29

## 2025-06-27 RX ORDER — NALOXONE HYDROCHLORIDE 0.4 MG/ML
0.08 INJECTION, SOLUTION INTRAMUSCULAR; INTRAVENOUS; SUBCUTANEOUS
Status: ACTIVE | OUTPATIENT
Start: 2025-06-27 | End: 2025-06-28

## 2025-06-27 RX ORDER — SODIUM CHLORIDE, SODIUM LACTATE, POTASSIUM CHLORIDE, CALCIUM CHLORIDE 600; 310; 30; 20 MG/100ML; MG/100ML; MG/100ML; MG/100ML
125 INJECTION, SOLUTION INTRAVENOUS CONTINUOUS
Status: DISCONTINUED | OUTPATIENT
Start: 2025-06-27 | End: 2025-06-27 | Stop reason: HOSPADM

## 2025-06-27 RX ORDER — ONDANSETRON 2 MG/ML
INJECTION INTRAMUSCULAR; INTRAVENOUS AS NEEDED
Status: DISCONTINUED | OUTPATIENT
Start: 2025-06-27 | End: 2025-06-27 | Stop reason: SURG

## 2025-06-27 RX ORDER — DIPHENHYDRAMINE HYDROCHLORIDE 50 MG/ML
12.5 INJECTION, SOLUTION INTRAMUSCULAR; INTRAVENOUS EVERY 4 HOURS PRN
Status: DISCONTINUED | OUTPATIENT
Start: 2025-06-27 | End: 2025-06-29

## 2025-06-27 RX ORDER — DOCUSATE SODIUM 100 MG/1
100 CAPSULE, LIQUID FILLED ORAL
Status: DISCONTINUED | OUTPATIENT
Start: 2025-06-27 | End: 2025-06-29

## 2025-06-27 RX ORDER — AMMONIA 15 % (W/V)
0.3 AMPUL (EA) INHALATION AS NEEDED
Status: DISCONTINUED | OUTPATIENT
Start: 2025-06-27 | End: 2025-06-29

## 2025-06-27 RX ORDER — ONDANSETRON 2 MG/ML
4 INJECTION INTRAMUSCULAR; INTRAVENOUS ONCE AS NEEDED
Status: ACTIVE | OUTPATIENT
Start: 2025-06-27 | End: 2025-06-27

## 2025-06-27 RX ORDER — KETOROLAC TROMETHAMINE 30 MG/ML
30 INJECTION, SOLUTION INTRAMUSCULAR; INTRAVENOUS EVERY 6 HOURS
Status: DISPENSED | OUTPATIENT
Start: 2025-06-28 | End: 2025-06-28

## 2025-06-27 RX ORDER — NALBUPHINE HYDROCHLORIDE 10 MG/ML
2.5 INJECTION INTRAMUSCULAR; INTRAVENOUS; SUBCUTANEOUS EVERY 4 HOURS PRN
Status: DISCONTINUED | OUTPATIENT
Start: 2025-06-27 | End: 2025-06-29

## 2025-06-27 RX ORDER — ONDANSETRON 2 MG/ML
4 INJECTION INTRAMUSCULAR; INTRAVENOUS EVERY 6 HOURS PRN
Status: DISCONTINUED | OUTPATIENT
Start: 2025-06-27 | End: 2025-06-27 | Stop reason: HOSPADM

## 2025-06-27 RX ORDER — NALBUPHINE HYDROCHLORIDE 10 MG/ML
2.5 INJECTION INTRAMUSCULAR; INTRAVENOUS; SUBCUTANEOUS
Status: DISCONTINUED | OUTPATIENT
Start: 2025-06-27 | End: 2025-06-29

## 2025-06-27 RX ORDER — ONDANSETRON 2 MG/ML
4 INJECTION INTRAMUSCULAR; INTRAVENOUS EVERY 6 HOURS PRN
Status: DISCONTINUED | OUTPATIENT
Start: 2025-06-27 | End: 2025-06-29

## 2025-06-27 RX ORDER — ACETAMINOPHEN 500 MG
1000 TABLET ORAL ONCE
Status: COMPLETED | OUTPATIENT
Start: 2025-06-27 | End: 2025-06-27

## 2025-06-27 RX ORDER — KETOROLAC TROMETHAMINE 30 MG/ML
INJECTION, SOLUTION INTRAMUSCULAR; INTRAVENOUS
Status: COMPLETED
Start: 2025-06-27 | End: 2025-06-27

## 2025-06-27 RX ORDER — DEXTROSE, SODIUM CHLORIDE, SODIUM LACTATE, POTASSIUM CHLORIDE, AND CALCIUM CHLORIDE 5; .6; .31; .03; .02 G/100ML; G/100ML; G/100ML; G/100ML; G/100ML
INJECTION, SOLUTION INTRAVENOUS CONTINUOUS PRN
Status: DISCONTINUED | OUTPATIENT
Start: 2025-06-27 | End: 2025-06-29

## 2025-06-27 RX ORDER — SIMETHICONE 80 MG
80 TABLET,CHEWABLE ORAL 3 TIMES DAILY PRN
Status: DISCONTINUED | OUTPATIENT
Start: 2025-06-27 | End: 2025-06-29

## 2025-06-27 RX ORDER — IBUPROFEN 600 MG/1
600 TABLET, FILM COATED ORAL EVERY 6 HOURS
Status: DISCONTINUED | OUTPATIENT
Start: 2025-06-28 | End: 2025-06-29

## 2025-06-27 RX ORDER — MORPHINE SULFATE 2 MG/ML
INJECTION, SOLUTION INTRAMUSCULAR; INTRAVENOUS AS NEEDED
Status: DISCONTINUED | OUTPATIENT
Start: 2025-06-27 | End: 2025-06-27 | Stop reason: SURG

## 2025-06-27 RX ORDER — ACETAMINOPHEN 500 MG
1000 TABLET ORAL EVERY 6 HOURS
Status: DISCONTINUED | OUTPATIENT
Start: 2025-06-27 | End: 2025-06-29

## 2025-06-27 RX ORDER — BUPIVACAINE HYDROCHLORIDE 7.5 MG/ML
INJECTION, SOLUTION INTRASPINAL AS NEEDED
Status: DISCONTINUED | OUTPATIENT
Start: 2025-06-27 | End: 2025-06-27 | Stop reason: SURG

## 2025-06-27 RX ORDER — LEVOTHYROXINE SODIUM 112 UG/1
112 TABLET ORAL
Status: DISCONTINUED | OUTPATIENT
Start: 2025-06-29 | End: 2025-06-29

## 2025-06-27 RX ORDER — CHOLECALCIFEROL (VITAMIN D3) 25 MCG
1 TABLET,CHEWABLE ORAL DAILY
Status: DISCONTINUED | OUTPATIENT
Start: 2025-06-27 | End: 2025-06-29

## 2025-06-27 RX ORDER — GABAPENTIN 300 MG/1
300 CAPSULE ORAL EVERY 8 HOURS PRN
Status: DISCONTINUED | OUTPATIENT
Start: 2025-06-27 | End: 2025-06-29

## 2025-06-27 RX ORDER — DIPHENHYDRAMINE HCL 25 MG
25 CAPSULE ORAL EVERY 4 HOURS PRN
Status: DISCONTINUED | OUTPATIENT
Start: 2025-06-27 | End: 2025-06-29

## 2025-06-27 RX ORDER — DEXAMETHASONE SODIUM PHOSPHATE 4 MG/ML
VIAL (ML) INJECTION AS NEEDED
Status: DISCONTINUED | OUTPATIENT
Start: 2025-06-27 | End: 2025-06-27 | Stop reason: SURG

## 2025-06-27 RX ORDER — BISACODYL 10 MG
10 SUPPOSITORY, RECTAL RECTAL ONCE AS NEEDED
Status: DISCONTINUED | OUTPATIENT
Start: 2025-06-27 | End: 2025-06-29

## 2025-06-27 RX ORDER — KETOROLAC TROMETHAMINE 30 MG/ML
30 INJECTION, SOLUTION INTRAMUSCULAR; INTRAVENOUS ONCE
Status: DISCONTINUED | OUTPATIENT
Start: 2025-06-27 | End: 2025-06-29

## 2025-06-27 RX ORDER — CITRIC ACID/SODIUM CITRATE 334-500MG
30 SOLUTION, ORAL ORAL ONCE
Status: DISCONTINUED | OUTPATIENT
Start: 2025-06-27 | End: 2025-06-27 | Stop reason: HOSPADM

## 2025-06-27 RX ADMIN — MORPHINE SULFATE 0.2 MG: 2 INJECTION, SOLUTION INTRAMUSCULAR; INTRAVENOUS at 12:03:00

## 2025-06-27 RX ADMIN — ONDANSETRON 4 MG: 2 INJECTION INTRAMUSCULAR; INTRAVENOUS at 12:03:00

## 2025-06-27 RX ADMIN — SODIUM CHLORIDE, SODIUM LACTATE, POTASSIUM CHLORIDE, CALCIUM CHLORIDE: 600; 310; 30; 20 INJECTION, SOLUTION INTRAVENOUS at 11:53:00

## 2025-06-27 RX ADMIN — DEXAMETHASONE SODIUM PHOSPHATE 4 MG: 4 MG/ML VIAL (ML) INJECTION at 12:19:00

## 2025-06-27 RX ADMIN — BUPIVACAINE HYDROCHLORIDE 1.5 ML: 7.5 INJECTION, SOLUTION INTRASPINAL at 12:03:00

## 2025-06-27 NOTE — PROGRESS NOTES
Patient transferred to mother/baby room 1119 per cart in stable condition with baby and personal belongings.  Accompanied by  and staff.  Report given to mother/baby RN.

## 2025-06-27 NOTE — PROGRESS NOTES
Patient admitted to mother baby unit with infant. Id bands checked with labor nurse. Call light within reach. Oriented to room.

## 2025-06-27 NOTE — ANESTHESIA POSTPROCEDURE EVALUATION
[facility]    Betzy Yang Patient Status:  Inpatient   Age/Gender 30 year old female MRN [id number]   Location [facility] LABOR & DELIVERY Attending Keisha Lazaro MD   Hosp Day # 0 PCP Shabana Sam MD       Anesthesia Post-op Note     SECTION    Procedure Summary       Date: 25 Room / Location:  L+D OR   L+D OR    Anesthesia Start: 1153 Anesthesia Stop: 1251    Procedure:  SECTION (Abdomen) Diagnosis:     Surgeons: Keisha Lazaro MD Anesthesiologist:     Anesthesia Type: spinal ASA Status: Not recorded            Anesthesia Type: spinal    Vitals Value Taken Time   /61 25 12:51   Temp  25 12:55   Pulse 63 25 12:54   Resp 15 25 12:54   SpO2 100 % 25 12:54   Vitals shown include unfiled device data.        Patient Location: PACU    Anesthesia Type: spinal    Airway Patency: patent    Postop Pain Control: adequate    Mental Status: preanesthetic baseline    Nausea/Vomiting: none    Cardiopulmonary/Hydration status: stable euvolemic    Complications: no apparent anesthesia related complications    Postop vital signs: stable    Dental Exam: Unchanged from Preop    Patient to be discharged from PACU when criteria met.

## 2025-06-27 NOTE — BRIEF OP NOTE
Pre-Operative Diagnosis: 39 weeks, repeat LTCS, breech     Post-Operative Diagnosis: same     Procedure Performed:    SECTION    Surgeons and Role:     * Keisha Lazaro MD - Primary     * Mirta Blas MD - Assisting Surgeon       Surgical Findings: male, 7# 5oz APGARS 8/9. Thin lower uterine segment. Normal ovaries and tubes bilaterally.     Specimen: none     Estimated Blood Loss: 800cc    Keisha Lazaro MD  2025  1:01 PM

## 2025-06-27 NOTE — OPERATIVE REPORT
[facility]   Section - Operative Note    Betzy Yang Patient Status:  Inpatient    3/27/1995 MRN [id number]   Location [facility] LABOR & DELIVERY Attending Keisha Lazaro MD   Hosp Day # 0 PCP Shabana Sam MD       Preoperative Diagnosis:  @ 39 weeks, breech, repeat LTCS         Postoperative Diagnosis: Same - Delivered    Procedure: Low Transverse  Section    Primary surgeon:  Iveth    Assistant: Wan    Indications:  breech, repeat LTCS at term.    Surgical Findings: Baby:  male  7# 5oz  APGAR 1': 8  APGAR 5': 9      Anesthesia:Spinal    EBL: 800cc    Procedure:     The patient was prepped and draped in the usual sterile fashion.  A Aviles catheter was placed.  Surgical time out was performed and scd’s were placed to decrease her risk for DVT and a dose of antibiotics was given preoperatively to decrease her risk for infection. After adequate level of anesthesia was ascertained, a Pfannenstiel incision was made and extended down to the level of the fascia over previous scar.  The fascia was incised and extended laterally with scalpel and gentle traction.  The inferior and superior aspect of the fascial incision was  elevated and dissected off the rectus muscles. The rectus muscles were  with gentle traction.  The peritoneal cavity was entered superiorly and extended inferiorly with visualization of the bladder.  A self-retaining abdominal retractor was placed.  The bladder flap was created. A low transverse incision was performed using a Eileen clamp, the excision was extended with gentle cephalocaudad traction. An amniotomy was performed. The amniotic fluid was clear. The infant was delivered feet to scapula, nuchal cord X1 reduced and then head delivered without difficulty.  The infant was vigorously crying. The cord was clamped and cut.  Cord blood was obtained.  The infant was shown to the parents and placed in the radiant warmer.  The placenta was  delivered intact with a 3 vessel cord.  Uterus, tubes and ovaries were normal in appearance.  The uterine cavity was swept clean using a wet lap.  The cervix was dilated with a ring forcep which was then passed off of the field.  The first layer of the hysterotomy was closed using 0 Chromic,  a second imbricating layer was placed with 0 Chromic.  The incision was inspected for hemostasis. The self retaining retractor was removed.  Re-inspection of the hysterotomy, peritoneum and rectus muscles noted them to be entirely hemostatic.  The rectus muscles were re-approximated using 2-0 vicryl in an interrupted fashion.  The fascia was closed with 0 vicryl in a running fashion.  The subcutaneous tissue was copiously irrigated and any bleeding cauterized, then reapproximated with O-plain.  The skin was closed with 4-0 vicryl  in a subcuticular fashion. Steri strips and benzoin applied.  The sponge, lab, needle,  and instrument counts were reported to me as being correct.  The patient tolerated the procedure and was stable to the recovery room.  The infant was stable and taken to the recovery room.    Specimen:  none    Drains: martinez    Condition:   stable    Complications: None; patient tolerated the procedure well.    Comment: Thin SHARATH, fetal feet visible through thin uterine wall.    Keisha Lazaro MD  6/27/2025  1:04 PM

## 2025-06-27 NOTE — PLAN OF CARE
Problem: BIRTH - VAGINAL/ SECTION  Goal: Fetal and maternal status remain reassuring during the birth process  Description: INTERVENTIONS:  - Monitor vital signs  - Monitor fetal heart rate  - Monitor uterine activity  - Monitor labor progression (vaginal delivery)  - DVT prophylaxis (C/S delivery)  - Surgical antibiotic prophylaxis (C/S delivery)  Outcome: Completed     Problem: PAIN - ADULT  Goal: Verbalizes/displays adequate comfort level or patient's stated pain goal  Description: INTERVENTIONS:  - Encourage pt to monitor pain and request assistance  - Assess pain using appropriate pain scale  - Administer analgesics based on type and severity of pain and evaluate response  - Implement non-pharmacological measures as appropriate and evaluate response  - Consider cultural and social influences on pain and pain management  - Manage/alleviate anxiety  - Utilize distraction and/or relaxation techniques  - Monitor for opioid side effects  - Notify MD/LIP if interventions unsuccessful or patient reports new pain  - Anticipate increased pain with activity and pre-medicate as appropriate  Outcome: Completed     Problem: ANXIETY  Goal: Will report anxiety at manageable levels  Description: INTERVENTIONS:  - Administer medication as ordered  - Teach and rehearse alternative coping skills  - Provide emotional support with 1:1 interaction with staff  Outcome: Completed     Problem: Patient/Family Goals  Goal: Patient/Family Long Term Goal  Description: Patient's Long Term Goal: safe delivery of infant    - See additional Care Plan goals for specific interventions  Outcome: Completed  Goal: Patient/Family Short Term Goal  Description: Patient's Short Term Goal: adequate pain management      - See additional Care Plan goals for specific interventions  Outcome: Completed      "9/10/2018       RE: Libby Mahajan  [address] [id number]     Dear Colleague,    Thank you for referring your patient, Libby Mahajan, to the [facility] at [facility]. Please see a copy of my visit note below.    SUBJECTIVE:   Libby is a 24 year old female who presents to clinic for a TRANSFER OF CARE visit.       at 36w5d with Estimated Date of Delivery: Oct 3, 2018 based on LMP (per patient report. No ultrasound records available).     Transfer of care from [facility].   Pt reports she received regular prenatal care. Moved here one month ago- did not explain why she did not present for care earlier. Present at appointment with her mother. , \"Jurgen,\" is in the [facility].   - States she had her records but lost them during the move.     Per pt report:   - Unclear history of elevated blood pressures:   -  Pt reports elevated BP early in pregnancy and was on anti-HTN medication.    - States meds were d'c'ed at approx 5 months of pregnancy.    - Reports that she had elevated BPs after delivery with previous pregnancy.  - Never diagnosed with CHTN,   - Agreeable to baseline pre-e labs today. Normotensive in clinic today.  - Failed 1 hour glucose, PASSED 3 hour   - States she did 3 hour glucose test this pregnancy and passed  - Gallstones- during this pregnancy  - Does not think she had GC/CT testing. Agreeable to testing today.    Reports no other problems in pregnancy.     OB history:   - 2 prior    - 2009 39+0, Girl, 6lb 13oz   - 3/8/2016 @ 39+2, Boy, 8lb 12oz    Denies cramping/contractions, vaginal bleeding, discharge or leakage of fluid. Report +fetal movement.  No HA, vision changes, ruq/epigastric pain.      ===========================================   ROS: 10 point ROS neg other than the symptoms noted above in the HPI.      PSYCHIATRIC:  Denies mood changes, difficulty concentrating, depression, " anxiety, panic attacks or post partum depression  PHQ9= 8, GAD7= 5    Past History:  Her past medical history   Past Medical History:   Diagnosis Date     Gallstone     2018 in pregnancy   .   HISTORY:  Family History   Problem Relation Age of Onset     Hypertension Mother      Asthma Mother      Social History     Social History     Marital status:      Spouse name: Jurgen     Number of children: N/A     Years of education: N/A     Social History Main Topics     Smoking status: Never Smoker     Smokeless tobacco: Never Used     Alcohol use Yes      Comment: rare before stopped in pregnancy     Drug use: No     Sexual activity: Yes     Partners: Male     Other Topics Concern     None     Social History Narrative    How much exercise per week? No much    How much calcium per day? In foods  In dairy       How much caffeine per day? 0    How much vitamin D per day? In foods    Do you/your family wear seatbelts?  Yes    Do you/your family use safety helmets? Yes    Do you/your family use sunscreen? Yes    Do you/your family keep firearms in the home? No    Do you/your family have a smoke detector(s)? Yes            Reviewed cmckim lpn 9-             No current outpatient prescriptions on file.     No current facility-administered medications for this visit.      No Known Allergies    ============================================  MEDICAL HISTORY  Past Medical History:   Diagnosis Date     Gallstone     2018 in pregnancy     Past Surgical History:   Procedure Laterality Date     NO HISTORY OF SURGERY         Obstetric History       T2      L2     SAB0   TAB0   Ectopic0   Multiple0   Live Births0       # Outcome Date GA Lbr Elie/2nd Weight Sex Delivery Anes PTL Lv   3 Current            2 Term 16 39w2d  3.969 kg (8 lb 12 oz) M          Name: Jurgen   1 Term 09 39w0d  3.09 kg (6 lb 13 oz) F          Name: Kelly MALDONADO personally reviewed the past social/family/medical  "and surgical history on the date of service.   I reviewed lab work done at Intake visit with patient.    EXAM:  /78  Pulse 105  Ht 1.651 m (5' 5\")  Wt 88.2 kg (194 lb 8 oz)  LMP 2017  BMI 32.37 kg/m2   EXAM:  GENERAL:  Pleasant pregnant female, alert, cooperative and well groomed.  SKIN:  Warm and dry, without lesions or rashes  HEAD: Symmetrical features.  MOUTH:  Buccal mucosa pink, moist without lesions.  Teeth in good repair.    NECK:  Thyroid without enlargement and nodules.  Lymph nodes not palpable.   LUNGS:  Clear to auscultation.  BREAST: deferred   HEART:  RRR without murmur.  ABDOMEN: Soft without masses , tenderness or organomegaly.  No CVA tenderness.  Uterus palpable at size equal to dates.  No scars noted.. Fetal heart tones present.  MUSCULOSKELETAL:  Full range of motion  EXTREMITIES:  No edema. No significant varicosities.   PELVIC EXAM: deferred  GC/CHLAMYDIA CULTURE OBTAINED:YES    ASSESSMENT:  24 year old , 36w5d weeks of pregnancy with MARIA DEL CARMEN of Oct 3, 2018 by LMP.   No ultrasound records available.      ICD-10-CM    1. Supervision of high-risk pregnancy, third trimester O09.93 Group B strep PCR     CBC with Platelets     AST     ALT     Protein  random urine with Creat Ratio     Creatinine     Uric acid     Chlamydia trachomatis PCR     Gonorrhea PCR     ABO/Rh Type and Screen     US OB Single Follow Up Repeat     Creatinine urine calculation only     Hgb with reflex to ELP or RBC Solubility (Sickle Cell Screen)     Hepatitis B Surface Antigen     25- OH-Vitamin D     HIV Antigen Antibody Combo     Rubella Antibody IgG Quantitative     Treponema Abs w Reflex to RPR and Titer   2. Large for dates P08.1 Group B strep PCR     CBC with Platelets     AST     ALT     Protein  random urine with Creat Ratio     Creatinine     Uric acid     Chlamydia trachomatis PCR     Gonorrhea PCR     ABO/Rh Type and Screen     US OB Single Follow Up Repeat     Creatinine urine calculation only "     Hgb with reflex to ELP or RBC Solubility (Sickle Cell Screen)     Hepatitis B Surface Antigen     25- OH-Vitamin D     HIV Antigen Antibody Combo     Rubella Antibody IgG Quantitative     Treponema Abs w Reflex to RPR and Titer       PLAN:  - Reviewed use of triage nurse line and contacting the on-call provider after hours for an urgent need such as fever, vagina bleeding, bladder or vaginal infection, rupture of membranes,  or term labor.    - Reviewed CNM and MD care.   Desires CNM care.    - All pt's questions discussed and answered.  Pt verbalized understanding of and agreement to plan of care.     - Reviewed prenatal course per by patient report of history as no written records are available.  - RN attempting to obtain records.    - GBS obtained.  - Abo/Rh T&S ordered.  - GC/CT collected.  - Pre-e labs ordered d/t pt report of elevated BP on anti-HTN medication at beginning of pregnancy and elevated BP at end of previous pregnancy?  - Plan growth ultrasound with next visit d/t fundal height greater than dates.    - Continue SARAH visit next week if records available. Review EOB ed.     - Continue scheduled prenatal care, RTC in 1 week for growth ultrasound and 30 min visit and prn if questions or concerns    AHMET Guan CNM    Addendum:    Clinic RN has tried multiple times to obtain records and has been unsuccessful.  - Future orders placed for remaining routine prenatal labs (spoke with lab, unable to add on to previously drawn labs).  - Consider drawing these labs after discussion at next visit and/or if records still not available.  - Review EOB education at next visit.  - Review any more information re: hx of elevated BP.  May need to consult with OB MD team re: any recommendations for  surveillance or delivery timing.    AHMET Guan CNM         Again, thank you for allowing me to participate in the care of your patient.      Sincerely,    Alexander Galaviz  CNM

## 2025-06-27 NOTE — ANESTHESIA PROCEDURE NOTES
Spinal Block    Date/Time: 6/27/2025 11:58 AM    Performed by: Obed Duke MD  Authorized by: Obed Duke MD      General Information and Staff    Start Time:  6/27/2025 11:58 AM  End Time:  6/27/2025 12:03 PM  Anesthesiologist:  Obed Duke MD  Performed by:  Anesthesiologist  Patient Location:  OR  Site identification: surface landmarks    Reason for Block: at surgeon's request and surgical anesthesia    Preanesthetic Checklist: patient identified, IV checked, risks and benefits discussed, monitors and equipment checked, pre-op evaluation, timeout performed, anesthesia consent and sterile technique used      Procedure Details    Patient Position:  Sitting  Prep: ChloraPrep    Monitoring:  Cardiac monitor, heart rate and continuous pulse ox  Approach:  Midline  Location:  L3-4  Injection Technique:  Single-shot    Needle    Needle Type:  Sprotte  Needle Gauge:  24 G  Needle Length:  3.5 in    Assessment    Sensory Level:   Events: clear CSF, CSF aspirated, well tolerated and blood negative      Additional Comments

## 2025-06-27 NOTE — ANESTHESIA PREPROCEDURE EVALUATION
PRE-OP EVALUATION    Patient Name: Betzy Yang    Admit Diagnosis: No admission diagnoses are documented for this encounter.    Pre-op Diagnosis: * No pre-op diagnosis entered *     SECTION    Anesthesia Procedure:  SECTION    Surgeons and Role:     * Keisha Lazaro MD - Primary    Pre-op vitals reviewed.        There is no height or weight on file to calculate BMI.    Current medications reviewed.  Hospital Medications:  Current Medications[1]    Outpatient Medications:   Prescriptions Prior to Admission[2]    Allergies: Wheat gluten      Anesthesia Evaluation    Patient summary reviewed.    Anesthetic Complications  (-) history of anesthetic complications         GI/Hepatic/Renal    Negative GI/hepatic/renal ROS.                             Cardiovascular    Negative cardiovascular ROS.    Exercise tolerance: good     MET: >4                                           Endo/Other           (+) hypothyroidism                       Pulmonary    Negative pulmonary ROS.                       Neuro/Psych    Negative neuro/psych ROS.                          Hx CS x1        Past Surgical History[3]  Social Hx on file[4]  History   Drug Use No     Available pre-op labs reviewed.               Airway      Mallampati: II  Mouth opening: 3 FB  TM distance: 4 - 6 cm  Neck ROM: full Cardiovascular      Rhythm: regular  Rate: normal     Dental    Dentition appears grossly intact         Pulmonary      Breath sounds clear to auscultation bilaterally.               Other findings              ASA: 2   Plan: spinal  NPO status verified and patient meets guidelines.  Patient has not taken beta blockers in last 24 hours.  Post-procedure pain management plan discussed with surgeon and patient.      Plan/risks discussed with: patient and spouse (Risks of spinal discussed including bleeding, infection, damage to nerves (all very rare), headache, need to convert to GA and those risks including N/V, sore  throat, dental injruy, cardiopulmonary compromise. Pt in agreement with plan.)  Use of blood product(s) discussed with: patient    Consented to blood products.          Present on Admission:  **None**             [1]   No current facility-administered medications on file as of .   [2]   No medications prior to admission.   [3]   Past Surgical History:  Procedure Laterality Date          Cascilla teeth removed     [4]   Social History  Socioeconomic History    Marital status:    Tobacco Use    Smoking status: Never    Smokeless tobacco: Never   Vaping Use    Vaping status: Never Used   Substance and Sexual Activity    Alcohol use: Not Currently     Comment: 6-7 drinks weekly    Drug use: No    Sexual activity: Yes     Partners: Male     Birth control/protection: Pill   Other Topics Concern    Caffeine Concern Yes     Comment: 1-2 Q day    Exercise Yes     Comment: runs 3x per week

## 2025-06-27 NOTE — PROGRESS NOTES
Pt is a 30 year old female admitted to TR5/TRG5-A.     Chief Complaint   Patient presents with    Scheduled       Pt is  39w0d intra-uterine pregnancy.  History obtained, oriented to room, staff, and plan of care.     EFM tested and applied. Abdomen soft and non-tender.

## 2025-06-27 NOTE — H&P
[facility]  History & Physical    Betzy Doreen Yang Patient Status:  Inpatient    3/27/1995 MRN [id number]   Location [facility] LABOR & DELIVERY Attending Keisha Lazaro MD   Hosp Day # 0 PCP Shabana Sam MD     Subjective:   30 year old  @ 39 weeks scheduled for repeat LTCS for breech presentation. Pregnancy without complications.    Objective:  Vital signs in last 24 hours:         General:   alert, appears stated age and cooperative   Skin:   normal   HEENT:  PERRLA   Lungs:   clear to auscultation bilaterally   Heart:   regular rate and rhythm   Breasts:   normal without suspicious masses   Abdomen:  normal findings: Gravid   FHT:  125 BPM   Uterine Size: size equals dates   Presentations: breech   Cervix:                                  Lab Results   Component Value Date    WBC 5.9 2025    HGB 11.8 2025    HCT 33.7 2025    .0 2025       Assessment/Plan:   @ 39 weeks for repeat LTCS for breech   Procedure reviewed at length with patient. Risk of injury to bowel, bladder.  All questions answered.

## 2025-06-28 LAB
BASOPHILS # BLD AUTO: 0.02 X10(3) UL (ref 0–0.2)
BASOPHILS NFR BLD AUTO: 0.2 %
EOSINOPHIL # BLD AUTO: 0.07 X10(3) UL (ref 0–0.7)
EOSINOPHIL NFR BLD AUTO: 0.8 %
ERYTHROCYTE [DISTWIDTH] IN BLOOD BY AUTOMATED COUNT: 13.2 %
HCT VFR BLD AUTO: 31.1 % (ref 35–48)
HGB BLD-MCNC: 10.7 G/DL (ref 12–16)
IMM GRANULOCYTES # BLD AUTO: 0.04 X10(3) UL (ref 0–1)
IMM GRANULOCYTES NFR BLD: 0.5 %
LYMPHOCYTES # BLD AUTO: 2.27 X10(3) UL (ref 1–4)
LYMPHOCYTES NFR BLD AUTO: 25.9 %
MCH RBC QN AUTO: 32.9 PG (ref 26–34)
MCHC RBC AUTO-ENTMCNC: 34.4 G/DL (ref 31–37)
MCV RBC AUTO: 95.7 FL (ref 80–100)
MONOCYTES # BLD AUTO: 0.4 X10(3) UL (ref 0.1–1)
MONOCYTES NFR BLD AUTO: 4.6 %
NEUTROPHILS # BLD AUTO: 5.97 X10 (3) UL (ref 1.5–7.7)
NEUTROPHILS # BLD AUTO: 5.97 X10(3) UL (ref 1.5–7.7)
NEUTROPHILS NFR BLD AUTO: 68 %
PLATELET # BLD AUTO: 220 10(3)UL (ref 150–450)
RBC # BLD AUTO: 3.25 X10(6)UL (ref 3.8–5.3)
WBC # BLD AUTO: 8.8 X10(3) UL (ref 4–11)

## 2025-06-28 NOTE — PROGRESS NOTES
[facility] 1SW-J ori Yang Patient Status:  Inpatient   Age/Gender 30 year old female MRN [id number]   Location [facility] 1SW-J Attending Keisha Lazaro MD   Hosp Day # 1 PCP Shabana Sam MD      Anesthesia Pain Progress Note    Anesthesia Technique:   Spinal Anesthesia     Pain Management Technique:  In addition to available oral supplemental and IV medications  Patient received neuraxial preservative free morphine for post procedural pain control.    Post Procedure Pain Quality:    Adequate    Pain Management Side Effects:  None     /53 (BP Location: Right arm)   Pulse 80   Temp 97.6 °F (36.4 °C)   Resp 16   LMP 2024   SpO2 98%   Breastfeeding Yes       Injection Site: Injection site is intact on inspection     Complications from Pain Management or Anesthesia:   None    All patient questions were answered.  Follow up pain management is separate from intraoperative anesthetic needs.  Pain care is transitioned to primary service, with management by oral medications.    Thank you for asking us to participate in the care of your patient.    Pancho Buck MD, 25, 7:38 AM      Pancho Buck MD, 25, 7:38 AM

## 2025-06-28 NOTE — PROGRESS NOTES
[facility]  Post-Partum Caesarean Section Progress Note    Betzy Yang Patient Status:  Inpatient    3/27/1995 MRN [id number]   Location [facility] 1SW-J Attending Keisha Lazaro MD   Hosp Day # 1 PCP Shabana Sam MD     SUBJECTIVE:    Postpartum Day 1:  Delivery    The patient feels well. The patient denies emotional concerns. Pain is well controlled with current medications.Lochia is minimal. Urinary output is adequate.  The patient is tolerating a  diet. Flatus has been passed. Pumping without difficulty.      OBJECTIVE:    Vital signs in last 24 hours:  Temp:  [97.3 °F (36.3 °C)-98.4 °F (36.9 °C)] 98 °F (36.7 °C)  Pulse:  [] 68  Resp:  [14-23] 16  BP: ()/(53-80) 111/60  SpO2:  [92 %-100 %] 98 %  Input/Output:    Intake/Output Summary (Last 24 hours) at 2025 0957  Last data filed at 2025 0808  Gross per 24 hour   Intake 1433 ml   Output 4400 ml   Net -2967 ml       General:    alert, appears stated age, and cooperative   Bowel Sounds:  active       Uterine Fundus:   firm below the umbilicus   Incision:  healing well, no significant drainage, no dehiscence, no significant erythema, well approximated with steri strips.     DVT Evaluation:  No evidence of DVT seen on physical exam. No CT, Tamy's - , edema tr     Data Reviewed:  Lab Results   Component Value Date    WBC 8.8 2025    HGB 10.7 2025    HCT 31.1 2025    .0 2025       ASSESSMENT/PLAN:    Status post  section. Doing well postoperatively.   Continue current care.    Keisha Lazaro MD  2025  9:57 AM

## 2025-06-29 VITALS
RESPIRATION RATE: 16 BRPM | SYSTOLIC BLOOD PRESSURE: 117 MMHG | OXYGEN SATURATION: 98 % | TEMPERATURE: 98 F | HEART RATE: 72 BPM | DIASTOLIC BLOOD PRESSURE: 75 MMHG

## 2025-06-29 PROBLEM — Z34.90 PREGNANCY (HCC): Status: RESOLVED | Noted: 2025-06-27 | Resolved: 2025-06-29

## 2025-06-29 RX ORDER — IBUPROFEN 600 MG/1
600 TABLET, FILM COATED ORAL EVERY 6 HOURS
Qty: 60 TABLET | Refills: 1 | Status: SHIPPED | OUTPATIENT
Start: 2025-06-29

## 2025-06-29 RX ORDER — GABAPENTIN 300 MG/1
300 CAPSULE ORAL EVERY 8 HOURS PRN
Qty: 30 CAPSULE | Refills: 1 | Status: SHIPPED | OUTPATIENT
Start: 2025-06-29

## 2025-06-29 NOTE — PROGRESS NOTES
NURSING DISCHARGE NOTE    Discharge instructions reviewed with patient. Patient encouraged to ask questions and discharge paperwork provided. Teal band explained and given to patient. Patient encouraged to make follow up appointment with OB/GYN for 2 weeks postpartum.

## 2025-06-29 NOTE — DISCHARGE SUMMARY
[facility]  Discharge Summary    Betzy Yang Patient Status:  Inpatient    3/27/1995 MRN [id number]   Location [facility] 1SW-J Attending Keisha Lazaro MD   Hosp Day # 2 PCP Shabana Sam MD     Primary OB Clinician: Iveth    EDC: Estimated Date of Delivery: 25    Gestational Age: 39w0d    Antepartum complications:     Date of Delivery: 2025    Time of Delivery: AM    Delivered By: Keisha Lazaro MD    Delivery Type: repeat  section, low transverse incision    Baby: Liveborn male, Apgars 8/9, weight 7 #, 5 oz.     Anesthesia: spinal    Intrapartum Complications: None    Laceration: n/a    Episiotomy: Pfannensteil    Placenta: manual removal    Feeding Method: breast fed    Rh Immune Globulin Given: no    Rubella Vaccine Given: no    Discharge Medications: PNV, Gabapentin, IBU    Discharge Date: 2025    Discharge Time: AM    Plan:     Address and phone number verified and same.  Follow-up appointment with Iveth in 2 weeks.    Keisha Lazaro MD  2025  7:00 AM

## 2025-06-29 NOTE — DISCHARGE INSTRUCTIONS
While you were here we were administering these following medications to you:     Ibuprofen 600 mg every 6 hours on the following schedule: 6 am-12 pm-6 pm-12 am    Tylenol 1000 mg every 6 hours on the following schedule: 3 am-9 am-3 pm-9 pm    Gabapentin 300 mg every 8 hours as needed for break through pain    Colace 100 mg twice daily at 6 am and 6 pm      Breastfeeding Suggestions for Plugged Duct/Breast Infection (mastitis)    Prior to handling baby, your breasts, or breast pump, remember to wash hands with soap and water.    Breastfeed on demand, wake baby by        1 ½ to 3 hours to feed if sleepy.     Apply a cold compress to your breasts for 10 minutes prior to breastfeeding  Your may try a light massage to your breasts.    Deep Latching on:  Try different positions, with baby's chin pointed toward the plugged area if possible  Express drops of milk onto your baby's lips to encourage licking.  Point your nipple to baby's nose  Stroke nipple lightly down center of lips  Wait for wide mouth with tongue cupped at bottom of mouth.  Chin should be deep into breast, with some room between nose and the breast.   If needed, gently draw chin down lower to deepen latch.    A light massage to your breasts can be used as needed during nursing/ pumping to soften firm areas.    Pump to soften the breast if still uncomfortably full after nursing or baby is uninterested in nursing.    Apply cold compresses for 10 minutes on your breast if needed to decrease breast swelling after nursing or pumping:     Rest and drink plenty of fluids.     Contact your OB doctor for a recommendation to discuss using Ibuprofen 600 mg every 6 hours to help decrease swelling    Call your OB doctor with any signs of   mastitis (breast infection)  Plugged area(s) are not soft within 24 hours.   Breast becomes firm, reddened, or painful.   Fever, chills, or flu-like symptoms. Check your temperature 3 times daily until a plugged duct or mastitis  resolves.    If mastitis occurs:  Continue breastfeeding and/or pumping - your milk is not infected.   Continue above treatment for relieving plugged area(s)  Check with your doctor about taking ibuprofen for relief of discomfort.  Continue to take antibiotic as prescribed even though you may quickly feel better.   Contact your doctor is you are not feeling significantly better within 1-2 days of starting antibiotic, sooner if symptoms worsen.    Call lactation consultant [phone] as needed.    Schedule an outpatient lactation appointment for feeding evaluation to evaluate if plugged duct or mastitis reoccurs.    Contact your OB doctor and discuss using Sunflower Lecithin supplements to treat recurrent plugged milk ducts    Some women have had success using this supplement to avoid recurrent plugged milk ducts  The recommendation may be 4023-9873 mg/day  Or  1200 mg 3-4 times a day    If no blockage of milk ducts are present after 2 weeks-you can decrease the supplement by 1 capsule per day    If stopping Lecithin results in recurrence of plugged milk ducts, Continue with 1-2 capsules per day    Consider taking Probiotics with Lactobacillus strains    For additional information:   La [employer] website www.carinali.org

## 2025-06-29 NOTE — PROGRESS NOTES
[facility]  Post-Partum Caesarean Section Progress Note    Betzy Yang Patient Status:  Inpatient    3/27/1995 MRN [id number]   Location [facility] 1SW-J Attending Keisha Lazaro MD   Hosp Day # 2 PCP Shabana Sam MD     SUBJECTIVE:    Postpartum Day 2:  Delivery    The patient feels well. The patient denies emotional concerns. Pain is well controlled with current medications.Lochia is minimal. Urinary output is adequate.  The patient is tolerating a  diet. Flatus has been passed. Breastfeeding without difficulty.      OBJECTIVE:    Vital signs in last 24 hours:  Temp:  [97.4 °F (36.3 °C)-98.2 °F (36.8 °C)] 97.4 °F (36.3 °C)  Pulse:  [67-79] 69  Resp:  [16] 16  BP: (109-124)/(59-78) 111/59  Input/Output:    Intake/Output Summary (Last 24 hours) at 2025 0657  Last data filed at 2025 0808  Gross per 24 hour   Intake --   Output 700 ml   Net -700 ml       General:    alert, appears stated age, and cooperative   Bowel Sounds:  active       Uterine Fundus:   firm below the umbilicus   Incision:  healing well, no significant drainage, no dehiscence, no significant erythema, well approximated with steri strips.     DVT Evaluation:  No evidence of DVT seen on physical exam. No CT, Tamy's -, edema tr     Data Reviewed:  Lab Results   Component Value Date    WBC 8.8 2025    HGB 10.7 2025    HCT 31.1 2025    .0 2025       ASSESSMENT/PLAN:    Status post  section. Doing well postoperatively.   Discharge home with standard precautions and return to clinic in 2 weeks.    Keisha Lazaro MD  2025  6:57 AM

## 2025-07-01 ENCOUNTER — TELEPHONE (OUTPATIENT)
Dept: OBGYN UNIT | Facility: HOSPITAL | Age: 30
End: 2025-07-01

## (undated) DEVICE — LARGE, DISPOSABLE ALEXIS O C-SECTION PROTECTOR - RETRACTOR: Brand: ALEXIS ® O C-SECTION PROTECTOR - RETRACTOR

## (undated) NOTE — LETTER
09/05/17        Betzy Weiss [facility]  [address]      Dear Prema Allen,    Our records indicate that you have outstanding lab work and or testing that was ordered for you and has not yet been completed:          TSH and Free T4 [E]  To pro

## (undated) NOTE — LETTER
Date: 2/22/2018    Patient Name: Sindhu Lopez          To Whom it may concern: This letter has been written at the patient's request. The above patient is under my care and was seen at the [facility] for treatment of a medical condition.

## (undated) NOTE — LETTER
May 15, 2025      Keisha Lazaro MD  [address]  [address]  [facility] [address]  Via Fax: [phone]  Patient: Betzy Yang  : 3/27/1995    Dear Colleague:  Thank you for referring your patient to me for a Maternal Fetal Medicine evaluation. Please see my attached note for my findings and recommendations.      Should you have any questions or concerns, please do not hesitate to contact me at the number listed below.    Best Regards,      Marky Tam MD  [facility]   [address]  [facility] [address]  [phone]    cc: No Recipients      [facility] Department of Maternal Fetal Medicine  Patient Name: Betzy Yang  Patient : 3/27/1995  Physician: Marky Tam MD    Outpatient Maternal-Fetal Medicine Consultation    Dear Dr. Lazaro    Thank you for requesting ultrasound evaluation and maternal fetal medicine consultation on your patient Betzy Yang.  As you are aware she is a 30 year old female  with a jain pregnancy and an Estimated Date of Delivery: 25.  A maternal-fetal medicine consultation was requested secondary to size < dates.  Her prenatal records and labs were reviewed.    HISTORY  # 1 - Date: 23, Sex: None, Weight: None, GA: None, Type: None, Apgar1: None, Apgar5: None, Living: None, Birth Comments: None    # 2 - Date: 23, Sex: Female, Weight: 7 lb 14.3 oz (3.58 kg), GA: 39w2d, Type: Caesarean Section, Apgar1: 8, Apgar5: 8, Living: Living, Birth Comments: None    # 3 - Date: None, Sex: None, Weight: None, GA: None, Type: None, Apgar1: None, Apgar5: None, Living: None, Birth Comments: None      Past Medical History  The patient  has a past medical history of Hypothyroidism and Ovarian cyst.    Past Surgical History  The patient  has a past surgical history that includes wisdom teeth removed.    Family History  The patient She indicated that her mother is alive. She indicated that her father is alive. She  indicated that the status of her maternal grandmother is unknown. She indicated that the status of her maternal grandfather is unknown. She indicated that her paternal grandmother is . She indicated that her paternal grandfather is alive.      Medications:   Current Outpatient Medications:   •  ferrous sulfate 325 (65 FE) MG Oral Tab EC, Take 1 tablet (325 mg total) by mouth daily with breakfast., Disp: , Rfl:   •  FOLIC ACID OR, Take by mouth., Disp: , Rfl:   •  Prenatal Vit-Fe Fumarate-FA (MULTI PRENATAL) 27-0.8 MG Oral Tab, Take by mouth., Disp: , Rfl:   •  levothyroxine 112 MCG Oral Tab, Take 1 tablet (112 mcg total) by mouth before breakfast. (Patient taking differently: Take 125 mcg by mouth. 125 mcg 5 days/week 137mcg 2 days/ week), Disp: 90 tablet, Rfl: 3  •  docusate sodium 100 MG Oral Cap, Take 100 mg by mouth 2 (two) times daily., Disp: 60 capsule, Rfl: 1  •  gabapentin 300 MG Oral Cap, Take 1 capsule (300 mg total) by mouth every 8 (eight) hours., Disp: 30 capsule, Rfl: 1  •  ibuprofen 600 MG Oral Tab, Take 1 tablet (600 mg total) by mouth every 6 (six) hours., Disp: 60 tablet, Rfl: 1  Allergies:   Allergies   Allergen Reactions   • Wheat Gluten DIARRHEA and OTHER (SEE COMMENTS)     Abdominal cramping       PHYSICAL EXAMINATION:  /71 (BP Location: Right arm, Patient Position: Sitting, Cuff Size: adult)   Pulse 92   Ht 5' 2\" (1.575 m)   Wt 173 lb (78.5 kg)   LMP 2024   BMI 31.64 kg/m²   General: alert and oriented,no acute distress  Abdomen: gravid, soft, non-tender  Extremities: non-tender, no edema    OBSTETRIC ULTRASOUND    Ultrasound Findings:  Single IUP in breech presentation.    Placenta is anterior.   A 3 vessel cord is noted.  Cardiac activity is present at 139 bpm  EFW 2036 g ( 4 lb 8 oz); 43%.    WAYNE is  10.4 cm.  MVP is 4.2 cm  BPP is 8/8.     The fetal measurements are consistent with established EDC. No gross ultrasound evidence of structural abnormalities are seen  today. The patient understands that ultrasound cannot rule out all structural and chromosomal abnormalities.     See PACS/Imaging Tab For Complete Ultrasound Report  I interpreted the results and reviewed them with the patient.    DISCUSSION  During her visit we discussed and reviewed the following issues:  SIZE < DATES  AGA growth today    IMPRESSION:  IUP at 32w6d  Hypothyroidism  Size < Dates - AGA growth    RECOMMENDATIONS:  Continue care with Dr. Lazaro  Routine antepartum care    Thank you for allowing me to participate in the care of your patient.  Please do not hesitate to contact me if additional questions or concerns arise.      Marky Tam M.D.    30 minutes spent in review of records, patient consultation, documentation and coordination of care.  The relevant clinical matter(s) are summarized above.     Note to patient and family  The 21st Century Cures Act makes medical notes available to patients in the interest of transparency.  However, please be advised that this is a medical document.  It is intended as [id number] communication.  It is written and medical language may contain abbreviations or verbiage that are technical and unfamiliar.  It may appear blunt or direct.  Medical documents are intended to carry relevant information, facts as evident, and the clinical opinion of the practitioner.      Pt here for growth ultrasound/doctor consult  + FM  No complaints

## (undated) NOTE — LETTER
Date: 2/19/2018    Patient Name: Princess Mckeon          To Whom it may concern: This letter has been written at the patient's request. The above patient was seen at the [facility] for treatment of a medical condition.     This patient shou

## (undated) NOTE — LETTER
07/26/17    Betzy Chowdary  3/27/1995        This document is to verify [facility] had a TB skin test performed and the results were: negative.     Date given: 07/24/17  Date read: 07/26/17        Please call the number listed above if you have further